# Patient Record
Sex: FEMALE | Race: BLACK OR AFRICAN AMERICAN | NOT HISPANIC OR LATINO | URBAN - METROPOLITAN AREA
[De-identification: names, ages, dates, MRNs, and addresses within clinical notes are randomized per-mention and may not be internally consistent; named-entity substitution may affect disease eponyms.]

---

## 2021-07-07 ENCOUNTER — INPATIENT (INPATIENT)
Facility: HOSPITAL | Age: 26
LOS: 1 days | Discharge: HOME | End: 2021-07-09
Attending: INTERNAL MEDICINE | Admitting: INTERNAL MEDICINE
Payer: MEDICARE

## 2021-07-07 VITALS
SYSTOLIC BLOOD PRESSURE: 128 MMHG | DIASTOLIC BLOOD PRESSURE: 68 MMHG | RESPIRATION RATE: 16 BRPM | WEIGHT: 134.92 LBS | HEIGHT: 63 IN | OXYGEN SATURATION: 97 % | HEART RATE: 95 BPM | TEMPERATURE: 98 F

## 2021-07-07 DIAGNOSIS — Z90.81 ACQUIRED ABSENCE OF SPLEEN: Chronic | ICD-10-CM

## 2021-07-07 DIAGNOSIS — Z90.49 ACQUIRED ABSENCE OF OTHER SPECIFIED PARTS OF DIGESTIVE TRACT: Chronic | ICD-10-CM

## 2021-07-07 LAB
ALBUMIN SERPL ELPH-MCNC: 4.4 G/DL — SIGNIFICANT CHANGE UP (ref 3.5–5.2)
ALP SERPL-CCNC: 72 U/L — SIGNIFICANT CHANGE UP (ref 30–115)
ALT FLD-CCNC: 12 U/L — SIGNIFICANT CHANGE UP (ref 0–41)
ANION GAP SERPL CALC-SCNC: 9 MMOL/L — SIGNIFICANT CHANGE UP (ref 7–14)
AST SERPL-CCNC: 23 U/L — SIGNIFICANT CHANGE UP (ref 0–41)
BASOPHILS # BLD AUTO: 0.11 K/UL — SIGNIFICANT CHANGE UP (ref 0–0.2)
BASOPHILS NFR BLD AUTO: 0.8 % — SIGNIFICANT CHANGE UP (ref 0–1)
BILIRUB SERPL-MCNC: 2.7 MG/DL — HIGH (ref 0.2–1.2)
BUN SERPL-MCNC: 7 MG/DL — LOW (ref 10–20)
CALCIUM SERPL-MCNC: 9.1 MG/DL — SIGNIFICANT CHANGE UP (ref 8.5–10.1)
CHLORIDE SERPL-SCNC: 102 MMOL/L — SIGNIFICANT CHANGE UP (ref 98–110)
CO2 SERPL-SCNC: 26 MMOL/L — SIGNIFICANT CHANGE UP (ref 17–32)
CREAT SERPL-MCNC: 0.5 MG/DL — LOW (ref 0.7–1.5)
EOSINOPHIL # BLD AUTO: 0.84 K/UL — HIGH (ref 0–0.7)
EOSINOPHIL NFR BLD AUTO: 6.1 % — SIGNIFICANT CHANGE UP (ref 0–8)
GLUCOSE SERPL-MCNC: 76 MG/DL — SIGNIFICANT CHANGE UP (ref 70–99)
HCG SERPL QL: NEGATIVE — SIGNIFICANT CHANGE UP
HCT VFR BLD CALC: 22.1 % — LOW (ref 37–47)
HGB BLD-MCNC: 7.8 G/DL — LOW (ref 12–16)
IMM GRANULOCYTES NFR BLD AUTO: 0.3 % — SIGNIFICANT CHANGE UP (ref 0.1–0.3)
LYMPHOCYTES # BLD AUTO: 39.8 % — SIGNIFICANT CHANGE UP (ref 20.5–51.1)
LYMPHOCYTES # BLD AUTO: 5.51 K/UL — HIGH (ref 1.2–3.4)
MAGNESIUM SERPL-MCNC: 1.8 MG/DL — SIGNIFICANT CHANGE UP (ref 1.8–2.4)
MCHC RBC-ENTMCNC: 33.2 PG — HIGH (ref 27–31)
MCHC RBC-ENTMCNC: 35.3 G/DL — SIGNIFICANT CHANGE UP (ref 32–37)
MCV RBC AUTO: 94 FL — SIGNIFICANT CHANGE UP (ref 81–99)
MONOCYTES # BLD AUTO: 1.07 K/UL — HIGH (ref 0.1–0.6)
MONOCYTES NFR BLD AUTO: 7.7 % — SIGNIFICANT CHANGE UP (ref 1.7–9.3)
NEUTROPHILS # BLD AUTO: 6.26 K/UL — SIGNIFICANT CHANGE UP (ref 1.4–6.5)
NEUTROPHILS NFR BLD AUTO: 45.3 % — SIGNIFICANT CHANGE UP (ref 42.2–75.2)
NRBC # BLD: 1 /100 WBCS — HIGH (ref 0–0)
PLATELET # BLD AUTO: 322 K/UL — SIGNIFICANT CHANGE UP (ref 130–400)
POTASSIUM SERPL-MCNC: 3.8 MMOL/L — SIGNIFICANT CHANGE UP (ref 3.5–5)
POTASSIUM SERPL-SCNC: 3.8 MMOL/L — SIGNIFICANT CHANGE UP (ref 3.5–5)
PROT SERPL-MCNC: 7.5 G/DL — SIGNIFICANT CHANGE UP (ref 6–8)
RBC # BLD: 2.35 M/UL — LOW (ref 4.2–5.4)
RBC # FLD: 18.3 % — HIGH (ref 11.5–14.5)
SARS-COV-2 RNA SPEC QL NAA+PROBE: SIGNIFICANT CHANGE UP
SODIUM SERPL-SCNC: 137 MMOL/L — SIGNIFICANT CHANGE UP (ref 135–146)
TROPONIN T SERPL-MCNC: <0.01 NG/ML — SIGNIFICANT CHANGE UP
WBC # BLD: 13.83 K/UL — HIGH (ref 4.8–10.8)
WBC # FLD AUTO: 13.83 K/UL — HIGH (ref 4.8–10.8)

## 2021-07-07 PROCEDURE — 99285 EMERGENCY DEPT VISIT HI MDM: CPT

## 2021-07-07 PROCEDURE — 99223 1ST HOSP IP/OBS HIGH 75: CPT

## 2021-07-07 PROCEDURE — 93010 ELECTROCARDIOGRAM REPORT: CPT

## 2021-07-07 PROCEDURE — 71045 X-RAY EXAM CHEST 1 VIEW: CPT | Mod: 26

## 2021-07-07 RX ORDER — DIPHENHYDRAMINE HCL 50 MG
25 CAPSULE ORAL EVERY 4 HOURS
Refills: 0 | Status: DISCONTINUED | OUTPATIENT
Start: 2021-07-07 | End: 2021-07-09

## 2021-07-07 RX ORDER — ENOXAPARIN SODIUM 100 MG/ML
40 INJECTION SUBCUTANEOUS DAILY
Refills: 0 | Status: DISCONTINUED | OUTPATIENT
Start: 2021-07-07 | End: 2021-07-07

## 2021-07-07 RX ORDER — HYDROXYUREA 500 MG/1
1500 CAPSULE ORAL
Qty: 0 | Refills: 0 | DISCHARGE

## 2021-07-07 RX ORDER — HYDROMORPHONE HYDROCHLORIDE 2 MG/ML
1 INJECTION INTRAMUSCULAR; INTRAVENOUS; SUBCUTANEOUS ONCE
Refills: 0 | Status: DISCONTINUED | OUTPATIENT
Start: 2021-07-07 | End: 2021-07-07

## 2021-07-07 RX ORDER — DIPHENHYDRAMINE HCL 50 MG
50 CAPSULE ORAL ONCE
Refills: 0 | Status: COMPLETED | OUTPATIENT
Start: 2021-07-07 | End: 2021-07-07

## 2021-07-07 RX ORDER — IBUPROFEN 200 MG
600 TABLET ORAL ONCE
Refills: 0 | Status: COMPLETED | OUTPATIENT
Start: 2021-07-07 | End: 2021-07-07

## 2021-07-07 RX ORDER — FOLIC ACID 0.8 MG
1 TABLET ORAL
Qty: 0 | Refills: 0 | DISCHARGE

## 2021-07-07 RX ORDER — KETOROLAC TROMETHAMINE 30 MG/ML
15 SYRINGE (ML) INJECTION ONCE
Refills: 0 | Status: DISCONTINUED | OUTPATIENT
Start: 2021-07-07 | End: 2021-07-07

## 2021-07-07 RX ORDER — METOCLOPRAMIDE HCL 10 MG
10 TABLET ORAL ONCE
Refills: 0 | Status: COMPLETED | OUTPATIENT
Start: 2021-07-07 | End: 2021-07-07

## 2021-07-07 RX ORDER — HYDROMORPHONE HYDROCHLORIDE 2 MG/ML
8 INJECTION INTRAMUSCULAR; INTRAVENOUS; SUBCUTANEOUS ONCE
Refills: 0 | Status: DISCONTINUED | OUTPATIENT
Start: 2021-07-07 | End: 2021-07-07

## 2021-07-07 RX ORDER — ACETAMINOPHEN 500 MG
650 TABLET ORAL EVERY 6 HOURS
Refills: 0 | Status: DISCONTINUED | OUTPATIENT
Start: 2021-07-07 | End: 2021-07-09

## 2021-07-07 RX ORDER — SODIUM CHLORIDE 9 MG/ML
1000 INJECTION INTRAMUSCULAR; INTRAVENOUS; SUBCUTANEOUS ONCE
Refills: 0 | Status: COMPLETED | OUTPATIENT
Start: 2021-07-07 | End: 2021-07-07

## 2021-07-07 RX ORDER — KETOROLAC TROMETHAMINE 30 MG/ML
15 SYRINGE (ML) INJECTION EVERY 6 HOURS
Refills: 0 | Status: DISCONTINUED | OUTPATIENT
Start: 2021-07-07 | End: 2021-07-09

## 2021-07-07 RX ORDER — HYDROXYUREA 500 MG/1
1500 CAPSULE ORAL DAILY
Refills: 0 | Status: DISCONTINUED | OUTPATIENT
Start: 2021-07-07 | End: 2021-07-09

## 2021-07-07 RX ORDER — APIXABAN 2.5 MG/1
5 TABLET, FILM COATED ORAL EVERY 12 HOURS
Refills: 0 | Status: DISCONTINUED | OUTPATIENT
Start: 2021-07-07 | End: 2021-07-09

## 2021-07-07 RX ORDER — SODIUM CHLORIDE 9 MG/ML
1000 INJECTION, SOLUTION INTRAVENOUS
Refills: 0 | Status: DISCONTINUED | OUTPATIENT
Start: 2021-07-07 | End: 2021-07-09

## 2021-07-07 RX ORDER — SODIUM CHLORIDE 9 MG/ML
1000 INJECTION INTRAMUSCULAR; INTRAVENOUS; SUBCUTANEOUS
Refills: 0 | Status: DISCONTINUED | OUTPATIENT
Start: 2021-07-07 | End: 2021-07-07

## 2021-07-07 RX ORDER — HYDROMORPHONE HYDROCHLORIDE 2 MG/ML
2 INJECTION INTRAMUSCULAR; INTRAVENOUS; SUBCUTANEOUS EVERY 4 HOURS
Refills: 0 | Status: DISCONTINUED | OUTPATIENT
Start: 2021-07-07 | End: 2021-07-08

## 2021-07-07 RX ADMIN — Medication 15 MILLIGRAM(S): at 17:19

## 2021-07-07 RX ADMIN — Medication 600 MILLIGRAM(S): at 14:08

## 2021-07-07 RX ADMIN — HYDROMORPHONE HYDROCHLORIDE 1 MILLIGRAM(S): 2 INJECTION INTRAMUSCULAR; INTRAVENOUS; SUBCUTANEOUS at 19:35

## 2021-07-07 RX ADMIN — Medication 50 MILLIGRAM(S): at 14:08

## 2021-07-07 RX ADMIN — SODIUM CHLORIDE 1000 MILLILITER(S): 9 INJECTION INTRAMUSCULAR; INTRAVENOUS; SUBCUTANEOUS at 15:00

## 2021-07-07 RX ADMIN — Medication 25 MILLIGRAM(S): at 20:39

## 2021-07-07 RX ADMIN — HYDROMORPHONE HYDROCHLORIDE 2 MILLIGRAM(S): 2 INJECTION INTRAMUSCULAR; INTRAVENOUS; SUBCUTANEOUS at 20:39

## 2021-07-07 RX ADMIN — HYDROMORPHONE HYDROCHLORIDE 8 MILLIGRAM(S): 2 INJECTION INTRAMUSCULAR; INTRAVENOUS; SUBCUTANEOUS at 14:08

## 2021-07-07 NOTE — ED PROVIDER NOTE - CLINICAL SUMMARY MEDICAL DECISION MAKING FREE TEXT BOX
pt with sickle pain crisis, will check labs, cxr, sickle pain protocol in ED failed to control pain, will admit for pain control

## 2021-07-07 NOTE — H&P ADULT - HISTORY OF PRESENT ILLNESS
25 year old female with pmh of sickle cell disease (on hydroxyurea. follows up at [facility]) presenting with chest pain. Patient reports that she had Sickle cell since childhood with recurrent episodes of sickle cell crisis. her last episode was 2 months ago and she was admitted to [facility]. Today, patient had similar episode with severe chest pain radiating to her arms and rib pain. no SOB, fever, chills, or any other symptoms. she reports that her baseline hb is 8 and she usually gets transfusion when the hb is around 6. she works in [place] so she decided to come here rather than [place]    In the ED, she was HD stable. labs showed leukocytosis 13K, anemia hb 7.8 , bilirubin 2.7. CXR and EKG within normal limits. she was given 1 liter flush and dilaudid/NSAID 25 year old female with pmh of sickle cell disease (on hydroxyurea. follows up at [facility]),  hx of ACS, hx of PE on eliquis, presenting with chest pain. Patient reports that she had Sickle cell since childhood with recurrent episodes of sickle cell crisis. her last episode was 2 months ago and she was admitted to [facility]. Today, patient had similar episode with severe chest pain radiating to her arms and rib pain. no SOB, fever, chills, or any other symptoms. she reports that her baseline hb is 8 and she usually gets transfusion when the hb is around 6. she works in [place] so she decided to come here rather than [place]    In the ED, she was HD stable. labs showed leukocytosis 13K, anemia hb 7.8 , bilirubin 2.7. CXR and EKG within normal limits. she was given 1 liter flush and dilaudid/NSAID

## 2021-07-07 NOTE — ED PROVIDER NOTE - ATTENDING CONTRIBUTION TO CARE
24 yo f with pmh of SSD, acute chest syndrome, PE, presents with c/o pain crisis.  pt admits has chest pain started yesterday, but arms, legs and back pain started today.  pt denies SOB.  no n/v/d, abd pain.  exam: nad, ncat, perrl, eomi, mmm, rrr, ctab, abd soft, nt,nd aox3, ttp all extremities, no leg swelling imp: pt with sickle pain crisis, will check labs, cxr, sickle pain protocol, possible admission

## 2021-07-07 NOTE — ED ADULT TRIAGE NOTE - CHIEF COMPLAINT QUOTE
pt came in with chest pain that started last night, pt has hx of sickle cell, pt stated " I feel like I'm in sickle cell crisis since this morning".

## 2021-07-07 NOTE — H&P ADULT - NSHPPHYSICALEXAM_GEN_ALL_CORE
PHYSICAL EXAM:    GENERAL: NAD, well-developed, AAOx3  HEENT:  Atraumatic, Normocephalic. EOMI, PERRLA, conjunctiva and sclera clear, No JVD  PULMONARY: Clear to auscultation bilaterally; No wheeze  CARDIOVASCULAR: Regular rate and rhythm; No murmurs, rubs, or gallops  GASTROINTESTINAL: Soft, Nontender, Nondistended; Bowel sounds present  MUSCULOSKELETAL:  2+ Peripheral Pulses, No clubbing, cyanosis, or edema  NEUROLOGY: non-focal  SKIN: No rashes or lesions PHYSICAL EXAM:    Vital Signs Last 24 Hrs  T(C): 37 (07 Jul 2021 22:24), Max: 37 (07 Jul 2021 22:24)  T(F): 98.6 (07 Jul 2021 22:24), Max: 98.6 (07 Jul 2021 22:24)  HR: 70 (07 Jul 2021 22:24) (70 - 95)  BP: 112/71 (07 Jul 2021 22:24) (112/71 - 128/68)  BP(mean): --  RR: 18 (07 Jul 2021 22:24) (16 - 18)  SpO2: 100% (07 Jul 2021 16:25) (97% - 100%)    GENERAL: NAD, well-developed, AAOx3  HEENT:  Atraumatic, Normocephalic. EOMI, PERRLA, conjunctiva and sclera clear, No JVD  PULMONARY: Clear to auscultation bilaterally; No wheeze  CARDIOVASCULAR: Regular rate and rhythm; No murmurs, rubs, or gallops  GASTROINTESTINAL: Soft, Nontender, Nondistended; Bowel sounds present  MUSCULOSKELETAL:  2+ Peripheral Pulses, No clubbing, cyanosis, or edema  NEUROLOGY: non-focal  SKIN: No rashes or lesions

## 2021-07-07 NOTE — ED PROVIDER NOTE - OBJECTIVE STATEMENT
Pt is a 25 year old female with PMH Sickle cell disease (type SS, with heme specialist at [facility]) presents to ED with complaints of sickle cell crisis. Pt states pain is  located to chest. Pain is moderate, sharp, non radiating with no alleviating or aggravating factors. Pain is similar to last flare 1 month ago. Pt denies any fever, chills, body aches, sob, abdominal pain, NVCD. no family hx of heart disease

## 2021-07-07 NOTE — H&P ADULT - ASSESSMENT
25 year old female with pmh of sickle cell disease (on hydroxyurea. follows up at [place]) presenting with chest pain    # Sickle cell crisis  - no evidence of Acute chest syndrome ( no SOB, fever, normal CXR)  - Hb stable  - patient follows at [place] (patient considering transfer to [place] since she gets her treatment there)  - for pain control: will start dilaudid IV with IV benadryl PRN (patient reports this works for her)  - NSAID and Tylenol PRN  - consult hematology (patient takes Hydroxyurea at home)  - consult pain control  - DVT prophylaxis  - Hydration /hr  - no need for transfusion now. keep active T&S. CBC qd    # DVT prophylaxis: lovenox  # GI prophylaxis: not indicated  # Activity: as tolerated  # Dispo: acute. admit for pain management 25 year old female with pmh of sickle cell disease (on hydroxyurea. follows up at [place]) presenting with chest pain    # Sickle cell crisis  - no evidence of Acute chest syndrome ( no SOB, fever, normal CXR)  - Hb stable  - patient follows at [place] (patient considering transfer to [place] since she gets her treatment there)  - for pain control: will start dilaudid IV with IV benadryl PRN (patient reports this works for her)  - NSAID and Tylenol PRN  - consult hematology (patient takes Hydroxyurea at home)  - consult pain control  - DVT prophylaxis  - Hydration /hr  - no need for transfusion now. keep active T&S. CBC qd    # DVT prophylaxis: lovenox  # GI prophylaxis: not indicated  # Activity: as tolerated  # Dispo: acute. admit for pain management 25 year old female with pmh of sickle cell disease (on hydroxyurea. follows up at [place]) presenting with chest pain    # Sickle cell crisis  - no evidence of Acute chest syndrome ( no SOB, fever, normal CXR)  - Hb stable  - patient follows at [place] (patient considering transfer to [place] since she gets her treatment there)  - for pain control: will start dilaudid IV with IV benadryl PRN (patient reports this works for her)  - NSAID and Tylenol PRN  - continue home dose of Hydroxyurea  - consult pain control  - continue eliquis 5 bid for PE  - Hydration /hr  - no need for transfusion now. keep active T&S. CBC qd    # DVT prophylaxis: eliquis  # GI prophylaxis: not indicated  # Activity: as tolerated  # Dispo: acute. admit for pain management

## 2021-07-07 NOTE — H&P ADULT - NSHPLABSRESULTS_GEN_ALL_CORE
LABS:                        7.8    13.83 )-----------( 322      ( 07 Jul 2021 15:38 )             22.1     07-07    137  |  102  |  7<L>  ----------------------------<  76  3.8   |  26  |  0.5<L>    Ca    9.1      07 Jul 2021 15:38  Mg     1.8     07-07    TPro  7.5  /  Alb  4.4  /  TBili  2.7<H>  /  DBili  x   /  AST  23  /  ALT  12  /  AlkPhos  72  07-07          Troponin T, Serum: <0.01 ng/mL (07-07-21 @ 15:38)      CARDIAC MARKERS ( 07 Jul 2021 15:38 )  x     / <0.01 ng/mL / x     / x     / x          RADIOLOGY:      PHYSICAL EXAM:    GENERAL: NAD, well-developed, AAOx3  HEENT:  Atraumatic, Normocephalic. EOMI, PERRLA, conjunctiva and sclera clear, No JVD  PULMONARY: Clear to auscultation bilaterally; No wheeze  CARDIOVASCULAR: Regular rate and rhythm; No murmurs, rubs, or gallops  GASTROINTESTINAL: Soft, Nontender, Nondistended; Bowel sounds present  MUSCULOSKELETAL:  2+ Peripheral Pulses, No clubbing, cyanosis, or edema  NEUROLOGY: non-focal  SKIN: No rashes or lesions

## 2021-07-08 LAB
ALBUMIN SERPL ELPH-MCNC: 4.2 G/DL — SIGNIFICANT CHANGE UP (ref 3.5–5.2)
ALP SERPL-CCNC: 68 U/L — SIGNIFICANT CHANGE UP (ref 30–115)
ALT FLD-CCNC: 11 U/L — SIGNIFICANT CHANGE UP (ref 0–41)
ANION GAP SERPL CALC-SCNC: 7 MMOL/L — SIGNIFICANT CHANGE UP (ref 7–14)
APPEARANCE UR: CLEAR — SIGNIFICANT CHANGE UP
AST SERPL-CCNC: 26 U/L — SIGNIFICANT CHANGE UP (ref 0–41)
BILIRUB SERPL-MCNC: 3.8 MG/DL — HIGH (ref 0.2–1.2)
BILIRUB UR-MCNC: NEGATIVE — SIGNIFICANT CHANGE UP
BLD GP AB SCN SERPL QL: SIGNIFICANT CHANGE UP
BUN SERPL-MCNC: 9 MG/DL — LOW (ref 10–20)
CALCIUM SERPL-MCNC: 8.8 MG/DL — SIGNIFICANT CHANGE UP (ref 8.5–10.1)
CHLORIDE SERPL-SCNC: 106 MMOL/L — SIGNIFICANT CHANGE UP (ref 98–110)
CO2 SERPL-SCNC: 25 MMOL/L — SIGNIFICANT CHANGE UP (ref 17–32)
COLOR SPEC: YELLOW — SIGNIFICANT CHANGE UP
CREAT SERPL-MCNC: 0.5 MG/DL — LOW (ref 0.7–1.5)
DIFF PNL FLD: NEGATIVE — SIGNIFICANT CHANGE UP
GLUCOSE SERPL-MCNC: 70 MG/DL — SIGNIFICANT CHANGE UP (ref 70–99)
GLUCOSE UR QL: NEGATIVE — SIGNIFICANT CHANGE UP
HCT VFR BLD CALC: 18.4 % — LOW (ref 37–47)
HGB BLD-MCNC: 6.4 G/DL — CRITICAL LOW (ref 12–16)
IRON SATN MFR SERPL: 184 UG/DL — HIGH (ref 35–150)
IRON SATN MFR SERPL: 89 % — HIGH (ref 15–50)
KETONES UR-MCNC: NEGATIVE — SIGNIFICANT CHANGE UP
LDH SERPL L TO P-CCNC: 341 — HIGH (ref 50–242)
LEUKOCYTE ESTERASE UR-ACNC: NEGATIVE — SIGNIFICANT CHANGE UP
MCHC RBC-ENTMCNC: 32.8 PG — HIGH (ref 27–31)
MCHC RBC-ENTMCNC: 34.8 G/DL — SIGNIFICANT CHANGE UP (ref 32–37)
MCV RBC AUTO: 94.4 FL — SIGNIFICANT CHANGE UP (ref 81–99)
NITRITE UR-MCNC: NEGATIVE — SIGNIFICANT CHANGE UP
NRBC # BLD: 2 /100 WBCS — HIGH (ref 0–0)
PH UR: 6.5 — SIGNIFICANT CHANGE UP (ref 5–8)
PLATELET # BLD AUTO: 250 K/UL — SIGNIFICANT CHANGE UP (ref 130–400)
POTASSIUM SERPL-MCNC: 3.8 MMOL/L — SIGNIFICANT CHANGE UP (ref 3.5–5)
POTASSIUM SERPL-SCNC: 3.8 MMOL/L — SIGNIFICANT CHANGE UP (ref 3.5–5)
PROT SERPL-MCNC: 7 G/DL — SIGNIFICANT CHANGE UP (ref 6–8)
PROT UR-MCNC: NEGATIVE — SIGNIFICANT CHANGE UP
RBC # BLD: 1.95 M/UL — LOW (ref 4.2–5.4)
RBC # BLD: 1.95 M/UL — LOW (ref 4.2–5.4)
RBC # FLD: 18 % — HIGH (ref 11.5–14.5)
RETICS #: 247.1 K/UL — HIGH (ref 25–125)
RETICS/RBC NFR: 12.7 % — HIGH (ref 0.5–1.5)
SODIUM SERPL-SCNC: 138 MMOL/L — SIGNIFICANT CHANGE UP (ref 135–146)
SP GR SPEC: 1.01 — SIGNIFICANT CHANGE UP (ref 1.01–1.03)
TIBC SERPL-MCNC: 206 UG/DL — LOW (ref 220–430)
UIBC SERPL-MCNC: 22 UG/DL — LOW (ref 110–370)
UROBILINOGEN FLD QL: ABNORMAL
WBC # BLD: 19.51 K/UL — HIGH (ref 4.8–10.8)
WBC # FLD AUTO: 19.51 K/UL — HIGH (ref 4.8–10.8)

## 2021-07-08 PROCEDURE — 99233 SBSQ HOSP IP/OBS HIGH 50: CPT

## 2021-07-08 PROCEDURE — 71045 X-RAY EXAM CHEST 1 VIEW: CPT | Mod: 26

## 2021-07-08 RX ORDER — FOLIC ACID 0.8 MG
1 TABLET ORAL DAILY
Refills: 0 | Status: DISCONTINUED | OUTPATIENT
Start: 2021-07-08 | End: 2021-07-09

## 2021-07-08 RX ORDER — HYDROMORPHONE HYDROCHLORIDE 2 MG/ML
2 INJECTION INTRAMUSCULAR; INTRAVENOUS; SUBCUTANEOUS EVERY 4 HOURS
Refills: 0 | Status: DISCONTINUED | OUTPATIENT
Start: 2021-07-08 | End: 2021-07-09

## 2021-07-08 RX ORDER — SENNA PLUS 8.6 MG/1
2 TABLET ORAL AT BEDTIME
Refills: 0 | Status: DISCONTINUED | OUTPATIENT
Start: 2021-07-08 | End: 2021-07-09

## 2021-07-08 RX ADMIN — Medication 25 MILLIGRAM(S): at 01:00

## 2021-07-08 RX ADMIN — Medication 650 MILLIGRAM(S): at 11:33

## 2021-07-08 RX ADMIN — Medication 650 MILLIGRAM(S): at 23:17

## 2021-07-08 RX ADMIN — HYDROXYUREA 1500 MILLIGRAM(S): 500 CAPSULE ORAL at 22:35

## 2021-07-08 RX ADMIN — Medication 650 MILLIGRAM(S): at 00:01

## 2021-07-08 RX ADMIN — HYDROMORPHONE HYDROCHLORIDE 2 MILLIGRAM(S): 2 INJECTION INTRAMUSCULAR; INTRAVENOUS; SUBCUTANEOUS at 17:03

## 2021-07-08 RX ADMIN — HYDROMORPHONE HYDROCHLORIDE 2 MILLIGRAM(S): 2 INJECTION INTRAMUSCULAR; INTRAVENOUS; SUBCUTANEOUS at 01:00

## 2021-07-08 RX ADMIN — Medication 650 MILLIGRAM(S): at 17:04

## 2021-07-08 RX ADMIN — Medication 25 MILLIGRAM(S): at 10:14

## 2021-07-08 RX ADMIN — Medication 25 MILLIGRAM(S): at 17:03

## 2021-07-08 RX ADMIN — HYDROMORPHONE HYDROCHLORIDE 2 MILLIGRAM(S): 2 INJECTION INTRAMUSCULAR; INTRAVENOUS; SUBCUTANEOUS at 10:14

## 2021-07-08 RX ADMIN — Medication 1 MILLIGRAM(S): at 11:33

## 2021-07-08 RX ADMIN — HYDROMORPHONE HYDROCHLORIDE 2 MILLIGRAM(S): 2 INJECTION INTRAMUSCULAR; INTRAVENOUS; SUBCUTANEOUS at 05:25

## 2021-07-08 RX ADMIN — APIXABAN 5 MILLIGRAM(S): 2.5 TABLET, FILM COATED ORAL at 10:13

## 2021-07-08 RX ADMIN — APIXABAN 5 MILLIGRAM(S): 2.5 TABLET, FILM COATED ORAL at 22:34

## 2021-07-08 RX ADMIN — SENNA PLUS 2 TABLET(S): 8.6 TABLET ORAL at 22:34

## 2021-07-08 RX ADMIN — Medication 25 MILLIGRAM(S): at 22:35

## 2021-07-08 RX ADMIN — HYDROMORPHONE HYDROCHLORIDE 2 MILLIGRAM(S): 2 INJECTION INTRAMUSCULAR; INTRAVENOUS; SUBCUTANEOUS at 23:13

## 2021-07-08 RX ADMIN — HYDROMORPHONE HYDROCHLORIDE 2 MILLIGRAM(S): 2 INJECTION INTRAMUSCULAR; INTRAVENOUS; SUBCUTANEOUS at 22:34

## 2021-07-08 RX ADMIN — Medication 25 MILLIGRAM(S): at 05:24

## 2021-07-08 NOTE — PROGRESS NOTE ADULT - ASSESSMENT
26 yo F pt with sickle cell disease (reports history of monthly flare-ups and was admitted at [facility] in [place] ~ 1 mth ago) presented with pain in her chest and arm for one day.     # Acute sickle cell pain crisis. As of now no acute chest pain syndrome.  - CXR with no infiltrates  - pt is on room air  - cont IV hydration  - cont Dilaudid with Benadryl  - no need for transfusion for now  - monitor retic counts, LDH, CBC  - heme/onc eval   - cont Hydrea with Folate supplement     # Hx of pulmonary embolism on apixaban  - cont 5 mg BID    DVT ppx        24 yo F pt with sickle cell disease (reports history of monthly flare-ups and was admitted at [facility] in [place] ~ 1 mth ago) presented with pain in her chest and arm for one day.     # Acute sickle cell pain crisis. As of now no acute chest pain syndrome.  - CXR with no infiltrates  - pt is on room air  - cont IV hydration  - cont Dilaudid with Benadryl  - no need for transfusion for now  - monitor retic counts, LDH, CBC  - heme/onc eval   - cont Hydrea with Folate supplement     # leucocytosis - no clinical signs of infection, no fever, ROS negative, check UA with UCx, no ABx for now, monitor WBC.    # Hx of pulmonary embolism on apixaban  - cont 5 mg BID    DVT ppx

## 2021-07-08 NOTE — CHART NOTE - NSCHARTNOTEFT_GEN_A_CORE
Pt seen and interviewed at bedside, chart reviewed. 25 year old female with known history of sickle cell anemia, now admitted in sickle crisis. Pt sitting up in bed in no apparent distress, eating pizza. States her pain score at present is 6/10. I offered her a trial of PCA therapy but she refused. She stated she didn't want "all that medicine". I explained to her that the PCA would be under her control and she would likely use less medicine overall than if she had to ask for PRN pain meds. She stated she had PCA therapy before and she was adamant in her refusal. She stated she would prefer "around the clock medicine". Pt refusing intervention from the acute pain service at this time. Would suggest standing orders for IV morphine or IV dilaudid every 4-6 hours PRN as tolerated, as well as adequate IV hydration, ABGs to evaluate oxygenation, and active patient warming.

## 2021-07-08 NOTE — PROGRESS NOTE ADULT - ASSESSMENT
25 year old female with pmh of sickle cell disease (on hydroxyurea. follows up at [facility]) presenting with chest pain    # Sickle cell crisis  - no evidence of Acute chest syndrome ( no SOB, fever, normal CXR)  - Hemoglobin: 6.4 g/dL (07.08.21 @ 10:59)  - patient follows at [facility] (patient considering transfer to [facility] since she gets her treatment there)  - for pain control: acetaminophen tab, Ketorolac IV, hydromorphone IV with IV benadryl PRN (patient reports this works for her)  - continue home dose of Hydroxyurea 1.5g daily  - consult pain control  - eliquis 5mg bid for PE  - Hydration D5W+0.45%NS- 200ml/hr  - keep active T&S. CBC qd  - Iron profile workup pending    - Senna 2 tab every day    # DVT prophylaxis: eliquis  # GI prophylaxis: not indicated  # Activity: as tolerated  # Dispo: acute. admit for pain management

## 2021-07-09 ENCOUNTER — TRANSCRIPTION ENCOUNTER (OUTPATIENT)
Age: 26
End: 2021-07-09

## 2021-07-09 VITALS
HEART RATE: 100 BPM | RESPIRATION RATE: 18 BRPM | SYSTOLIC BLOOD PRESSURE: 129 MMHG | DIASTOLIC BLOOD PRESSURE: 67 MMHG | TEMPERATURE: 98 F

## 2021-07-09 LAB
ALBUMIN SERPL ELPH-MCNC: 4 G/DL — SIGNIFICANT CHANGE UP (ref 3.5–5.2)
ALP SERPL-CCNC: 67 U/L — SIGNIFICANT CHANGE UP (ref 30–115)
ALT FLD-CCNC: 13 U/L — SIGNIFICANT CHANGE UP (ref 0–41)
ANION GAP SERPL CALC-SCNC: 9 MMOL/L — SIGNIFICANT CHANGE UP (ref 7–14)
AST SERPL-CCNC: 28 U/L — SIGNIFICANT CHANGE UP (ref 0–41)
BASOPHILS # BLD AUTO: 0.14 K/UL — SIGNIFICANT CHANGE UP (ref 0–0.2)
BASOPHILS NFR BLD AUTO: 0.9 % — SIGNIFICANT CHANGE UP (ref 0–1)
BILIRUB SERPL-MCNC: 4.1 MG/DL — HIGH (ref 0.2–1.2)
BUN SERPL-MCNC: 6 MG/DL — LOW (ref 10–20)
CALCIUM SERPL-MCNC: 8.7 MG/DL — SIGNIFICANT CHANGE UP (ref 8.5–10.1)
CHLORIDE SERPL-SCNC: 105 MMOL/L — SIGNIFICANT CHANGE UP (ref 98–110)
CO2 SERPL-SCNC: 25 MMOL/L — SIGNIFICANT CHANGE UP (ref 17–32)
CREAT SERPL-MCNC: 0.5 MG/DL — LOW (ref 0.7–1.5)
EOSINOPHIL # BLD AUTO: 2.85 K/UL — HIGH (ref 0–0.7)
EOSINOPHIL NFR BLD AUTO: 18.3 % — HIGH (ref 0–8)
FERRITIN SERPL-MCNC: 1401 NG/ML — HIGH (ref 15–150)
GLUCOSE SERPL-MCNC: 83 MG/DL — SIGNIFICANT CHANGE UP (ref 70–99)
HCT VFR BLD CALC: 19 % — LOW (ref 37–47)
HGB BLD-MCNC: 6.5 G/DL — CRITICAL LOW (ref 12–16)
LDH SERPL L TO P-CCNC: 363 — HIGH (ref 50–242)
LYMPHOCYTES # BLD AUTO: 33.9 % — SIGNIFICANT CHANGE UP (ref 20.5–51.1)
LYMPHOCYTES # BLD AUTO: 5.28 K/UL — HIGH (ref 1.2–3.4)
MCHC RBC-ENTMCNC: 31.9 PG — HIGH (ref 27–31)
MCHC RBC-ENTMCNC: 34.2 G/DL — SIGNIFICANT CHANGE UP (ref 32–37)
MCV RBC AUTO: 93.1 FL — SIGNIFICANT CHANGE UP (ref 81–99)
MONOCYTES # BLD AUTO: 1.76 K/UL — HIGH (ref 0.1–0.6)
MONOCYTES NFR BLD AUTO: 11.3 % — HIGH (ref 1.7–9.3)
NEUTROPHILS # BLD AUTO: 4.73 K/UL — SIGNIFICANT CHANGE UP (ref 1.4–6.5)
NEUTROPHILS NFR BLD AUTO: 30.4 % — LOW (ref 42.2–75.2)
NRBC # BLD: SIGNIFICANT CHANGE UP /100 WBCS (ref 0–0)
PLATELET # BLD AUTO: 309 K/UL — SIGNIFICANT CHANGE UP (ref 130–400)
POTASSIUM SERPL-MCNC: 3.5 MMOL/L — SIGNIFICANT CHANGE UP (ref 3.5–5)
POTASSIUM SERPL-SCNC: 3.5 MMOL/L — SIGNIFICANT CHANGE UP (ref 3.5–5)
PROT SERPL-MCNC: 6.9 G/DL — SIGNIFICANT CHANGE UP (ref 6–8)
RBC # BLD: 2.04 M/UL — LOW (ref 4.2–5.4)
RBC # BLD: 2.04 M/UL — LOW (ref 4.2–5.4)
RBC # FLD: 18.4 % — HIGH (ref 11.5–14.5)
RETICS #: 260.9 K/UL — HIGH (ref 25–125)
RETICS/RBC NFR: 12.8 % — HIGH (ref 0.5–1.5)
SODIUM SERPL-SCNC: 139 MMOL/L — SIGNIFICANT CHANGE UP (ref 135–146)
WBC # BLD: 15.57 K/UL — HIGH (ref 4.8–10.8)
WBC # FLD AUTO: 15.57 K/UL — HIGH (ref 4.8–10.8)

## 2021-07-09 PROCEDURE — 99232 SBSQ HOSP IP/OBS MODERATE 35: CPT

## 2021-07-09 RX ORDER — HYDROMORPHONE HYDROCHLORIDE 2 MG/ML
2 INJECTION INTRAMUSCULAR; INTRAVENOUS; SUBCUTANEOUS ONCE
Refills: 0 | Status: DISCONTINUED | OUTPATIENT
Start: 2021-07-09 | End: 2021-07-09

## 2021-07-09 RX ORDER — APIXABAN 2.5 MG/1
1 TABLET, FILM COATED ORAL
Qty: 0 | Refills: 0 | DISCHARGE

## 2021-07-09 RX ADMIN — HYDROMORPHONE HYDROCHLORIDE 2 MILLIGRAM(S): 2 INJECTION INTRAMUSCULAR; INTRAVENOUS; SUBCUTANEOUS at 01:23

## 2021-07-09 RX ADMIN — Medication 650 MILLIGRAM(S): at 05:26

## 2021-07-09 RX ADMIN — HYDROMORPHONE HYDROCHLORIDE 2 MILLIGRAM(S): 2 INJECTION INTRAMUSCULAR; INTRAVENOUS; SUBCUTANEOUS at 11:24

## 2021-07-09 RX ADMIN — APIXABAN 5 MILLIGRAM(S): 2.5 TABLET, FILM COATED ORAL at 05:26

## 2021-07-09 RX ADMIN — HYDROMORPHONE HYDROCHLORIDE 2 MILLIGRAM(S): 2 INJECTION INTRAMUSCULAR; INTRAVENOUS; SUBCUTANEOUS at 14:18

## 2021-07-09 RX ADMIN — Medication 1 MILLIGRAM(S): at 11:24

## 2021-07-09 RX ADMIN — Medication 25 MILLIGRAM(S): at 01:30

## 2021-07-09 RX ADMIN — Medication 25 MILLIGRAM(S): at 08:30

## 2021-07-09 RX ADMIN — Medication 25 MILLIGRAM(S): at 12:52

## 2021-07-09 RX ADMIN — HYDROMORPHONE HYDROCHLORIDE 2 MILLIGRAM(S): 2 INJECTION INTRAMUSCULAR; INTRAVENOUS; SUBCUTANEOUS at 08:29

## 2021-07-09 NOTE — DISCHARGE NOTE PROVIDER - HOSPITAL COURSE
Pt. is a 25 year old female with sickle cell disease- on hydroxyurea(doubt compliance based on CBC volumes), follows up at [place]. There is history of recurrent episode of sickle cell crisis (last episode 2 months ago), PE on eliquis and ACS.    - In ED, she reported severe chest pain radiating to her arms, labs showed leukocytosis 13K, anemia hb 7.8 , bilirubin 2.7. CXR and EKG within normal limits. she was given 1 liter flush and dilaudid/NSAID.  - She reported her normal Hb baseline is 8 and usually gets transfusions if Hb is around 6.    = She was diagnosed with Sickle cell crisis, with no evidence of Acute chest syndrome ( no SOB, fever, normal CXR).  - Her Hemoglobin dropped till 6.4 g/dL (07.08.21 @ 10:59) and remained at Hemoglobin: 6.5 g/dL (07.09.21 @ 06:55). No transfusion was done.  - for pain control following was given: acetaminophen tab, Ketorolac IV, hydromorphone IV with IV benadryl PRN (patient reports this works for her), Folic acid and fluid.  - Her home dose of Hydroxyurea 1.5g daily was continued.  - Iron profile was abnormal:    Iron with Total Binding Capacity (07.08.21 @ 10:59)    Iron - Total Binding Capacity.: 206 ug/dL    % Saturation, Iron: 89 %    Iron Total, Serum: 184 ug/dL    Unsaturated Iron Binding Capacity: 22 ug/dL    - Apixaban 5 mg BD was continued to prevent PE.  - She refused PCA therapy for pain control. She stated she would prefer "around the clock medicine".  - There was one episode of abnormal behaviour on 7/8 night. Pt. undressed herself and roam around and also tried to pull out her port. Secondary to hydromorphone and benadryl?    - Pt. is stable right now.   Pt. is a 25 year old female with sickle cell disease- on hydroxyurea(doubt compliance based on CBC volumes), follows up at [place]. There is history of recurrent episode of sickle cell crisis (last episode 2 months ago), PE on eliquis and ACS.    - In ED, she reported severe chest pain radiating to her arms, labs showed leukocytosis 13K, anemia hb 7.8 , bilirubin 2.7. CXR and EKG within normal limits. she was given 1 liter flush and dilaudid/NSAID.  - She reported her normal Hb baseline is 8 and usually gets transfusions if Hb is around 6.    = She was diagnosed with Sickle cell crisis, with no evidence of Acute chest syndrome ( no SOB, fever, normal CXR).  - Her Hemoglobin dropped till 6.4 g/dL (07.08.21 @ 10:59) and remained at Hemoglobin: 6.5 g/dL (07.09.21 @ 06:55). No transfusion was done as Pt. with iron overload, and vitals stable.  - for pain control following was given: acetaminophen tab, Ketorolac IV, hydromorphone IV with IV benadryl PRN (patient reports this works for her), Folic acid and fluid.  - Her home dose of Hydroxyurea 1.5g daily was continued.  - Iron profile was abnormal:    Iron with Total Binding Capacity (07.08.21 @ 10:59)    Iron - Total Binding Capacity.: 206 ug/dL    % Saturation, Iron: 89 %    Iron Total, Serum: 184 ug/dL    Unsaturated Iron Binding Capacity: 22 ug/dL    - Apixaban 5 mg BD was continued to prevent PE.  - She refused PCA therapy for pain control. She stated she would prefer "around the clock medicine".  - There was one episode of abnormal behaviour on 7/8 night. Pt. undressed herself and roam around and also tried to pull out her port. Secondary to hydromorphone and benadryl?    - Pt. is stable right now.   Pt. is a 25 year old female with sickle cell disease- on hydroxyurea(doubt compliance based on CBC volumes), follows up at [place]. There is history of recurrent episode of sickle cell crisis (last episode 2 months ago), PE on eliquis and ACS.    - In ED, she reported severe chest pain radiating to her arms, labs showed leukocytosis 13K, anemia hb 7.8 , bilirubin 2.7. CXR and EKG within normal limits. she was given 1 liter flush and dilaudid/NSAID.  - She reported her normal Hb baseline is 8 and usually gets transfusions if Hb is around 6.    = She was diagnosed with Sickle cell crisis, with no evidence of Acute chest syndrome ( no SOB, fever, normal CXR).  - Her Hemoglobin dropped till 6.4 g/dL (07.08.21 @ 10:59) and remained at Hemoglobin: 6.5 g/dL (07.09.21 @ 06:55). No transfusion was done as Pt. with iron overload, and vitals stable.  - for pain control following was given: acetaminophen tab, Ketorolac IV, hydromorphone IV with IV benadryl PRN (patient reports this works for her), Folic acid and fluid.  - Her home dose of Hydroxyurea 1.5g daily was continued.  - Iron profile was abnormal:    Iron with Total Binding Capacity (07.08.21 @ 10:59)    Iron - Total Binding Capacity.: 206 ug/dL    % Saturation, Iron: 89 %    Iron Total, Serum: 184 ug/dL    Unsaturated Iron Binding Capacity: 22 ug/dL    - Apixaban 5 mg BD was continued to prevent PE.    Pt states her pain is better and she is insisting to be discharged today since she has some trips planned. Since labs stable pt will be discharged.

## 2021-07-09 NOTE — CHART NOTE - NSCHARTNOTEFT_GEN_A_CORE
<<<RESIDENT DISCHARGE NOTE>>>     DACIA CASTAÑEDA  MRN-[id number]    VITAL SIGNS:  T(F): 97.6 (07-09-21 @ 08:03), Max: 97.6 (07-09-21 @ 08:03)  HR: 89 (07-09-21 @ 08:03)  BP: 94/51 (07-09-21 @ 08:03)  SpO2: 97% (07-09-21 @ 08:03)      PHYSICAL EXAMINATION:  LOS: 2d    VITALS:   T(C): 36.4 (07-09-21 @ 08:03), Max: 36.4 (07-08-21 @ 23:00)  HR: 89 (07-09-21 @ 08:03) (87 - 89)  BP: 94/51 (07-09-21 @ 08:03) (94/51 - 133/60)  RR: 18 (07-09-21 @ 08:03) (18 - 18)  SpO2: 97% (07-09-21 @ 08:03) (97% - 97%)    GENERAL: NAD, lying in bed comfortably  HEAD:  Atraumatic, Normocephalic  CHEST/LUNG: Clear to auscultation bilaterally; No rales, rhonchi, wheezing, or rubs. Unlabored respirations  HEART: Regular rate and rhythm; No murmurs, rubs, or gallops  ABDOMEN: BSx4; Soft, nontender, nondistended  EXTREMITIES:  2+ Peripheral Pulses, brisk capillary refill. No clubbing, cyanosis, or edema  NERVOUS SYSTEM:  A&Ox3, no focal deficits   SKIN: Some dark spots were seen- generalized- were present on admission as well    TEST RESULTS:                        6.5    15.57 )-----------( 309      ( 09 Jul 2021 06:55 )             19.0       07-09    139  |  105  |  6<L>  ----------------------------<  83  3.5   |  25  |  0.5<L>    Ca    8.7      09 Jul 2021 06:55  Mg     1.8     07-07    TPro  6.9  /  Alb  4.0  /  TBili  4.1<H>  /  DBili  x   /  AST  28  /  ALT  13  /  AlkPhos  67  07-09

## 2021-07-09 NOTE — DISCHARGE NOTE PROVIDER - NSDCMRMEDTOKEN_GEN_ALL_CORE_FT
folic acid 1 mg oral tablet: 1 tab(s) orally once a day  hydroxyurea: 1500 milligram(s) orally once a day   Eliquis 5 mg oral tablet: 1 tab(s) orally 2 times a day  folic acid 1 mg oral tablet: 1 tab(s) orally once a day  hydroxyurea: 1500 milligram(s) orally once a day

## 2021-07-09 NOTE — DISCHARGE NOTE PROVIDER - CARE PROVIDER_API CALL
Tatiana Hidalgo [facility]  Hematology/Oncology  [phone] [place] [address]  Phone: [phone]  Fax: [phone]  Follow Up Time:

## 2021-07-09 NOTE — DISCHARGE NOTE PROVIDER - NSDCCPCAREPLAN_GEN_ALL_CORE_FT
PRINCIPAL DISCHARGE DIAGNOSIS  Diagnosis: Anemia, sickle cell with crisis  Assessment and Plan of Treatment: You had an episode of sickle cell crisis. Your red blood cells are abnormal in shape because you have sickle cell disease and when your body is in some any kind of stress then those cells can change shape and block the arteries leading to pain. Please,take your hydroxyurea as prescribed and stay hydrated to prevent this in future.       PRINCIPAL DISCHARGE DIAGNOSIS  Diagnosis: Anemia, sickle cell with crisis  Assessment and Plan of Treatment: You had an episode of sickle cell crisis. Your red blood cells are abnormal in shape because you have sickle cell disease and when your body is in some any kind of stress then those cells can change shape and block the arteries leading to pain. Please,take your hydroxyurea as prescribed and stay hydrated to prevent this in future. Please follow up with your hematologist.

## 2021-07-09 NOTE — DISCHARGE NOTE NURSING/CASE MANAGEMENT/SOCIAL WORK - PATIENT PORTAL LINK FT
You can access the FollowMyHealth Patient Portal offered by [facility] by registering at the following website: [email or web address]. By joining [facility]’s FollowMyHealth portal, you will also be able to view your health information using other applications (apps) compatible with our system.

## 2021-07-09 NOTE — CHART NOTE - NSCHARTNOTEFT_GEN_A_CORE
Pt was reported to be displaying abnormal behavior Overnight as per nursing staff. On examination patient was AAOX3 without any obvious neurological deficit on examination. Pt receives Dilaudid and benadryl IV, I held the dilaudid as patient does not report to be in pain. Will continue to monitor. Pt was reported to be displaying abnormal behavior Overnight as per nursing staff. On examination patient was AAOX3 without any obvious neurological deficit on examination. Pt receives Dilaudid and benadryl IV, I held the dilaudid as patient does not report to be in pain.     Plan  -Urine drug screen  -Pshyc consult prior to discharge  -Will continue to monitor. Dermal Autograft Text: The defect edges were debeveled with a #15 scalpel blade.  Given the location of the defect, shape of the defect and the proximity to free margins a dermal autograft was deemed most appropriate.  Using a sterile surgical marker, the primary defect shape was transferred to the donor site. The area thus outlined was incised deep to adipose tissue with a #15 scalpel blade.  The harvested graft was then trimmed of adipose and epidermal tissue until only dermis was left.  The skin graft was then placed in the primary defect and oriented appropriately.

## 2021-07-09 NOTE — PROGRESS NOTE ADULT - SUBJECTIVE AND OBJECTIVE BOX
DACIA CASTAÑEDA    Patient is a 25y old  Female who presents with a chief complaint of chest pain (2021 10:24)    INTERVAL HPI/OVERNIGHT EVENTS: Pt wanted leave AMA last night because she has upcoming trip, but decided to stay until today. Today her pain is better, chest pain and arms pain resolved, she has some pain in legs. No other complaints. Overall she feels better and insist to go home.      ROS: All ROS negative except legs pain     PHYSICAL EXAM:  T(C): 36.4, Max: 36.4 (21 @ 23:00)  HR: 89 (72 - 89)  BP: 94/51 (94/51 - 133/60)  RR: 18 (18 - 18)  SpO2: 97% (97% - 97%)    GENERAL: NAD  PULMONARY/CHEST: No rales, rhonchi, wheezing  CARDIOVASC: Regular rate and rhythm; No murmurs  GI/ABDOMEN: Soft, Nontender, Nondistended; Bowel sounds present  EXTREMITIES:  2+ Peripheral Pulses, No clubbing, cyanosis, or edema, no deformity. No calf tenderness b/l.  SKIN: +b/l LE and UE with flat top hyperpigmented rash  NERVOUS SYSTEM:  Alert & Oriented X3, no focal deficit     LABS:                        6.5    15.57 )-----------( 309      ( 2021 06:55 )             19.0     Reticulocyte Count (21 @ 06:55)   RBC Count: 2.04 M/uL   Reticulocyte Percent: 12.8 %   Absolute Reticulocytes: 260.9 K/uL   Lactate Dehydrogenase, Serum: 363 (21 @ 06:55)         139  |  105  |  6<L>  ----------------------------<  83  3.5   |  25  |  0.5<L>    Ca    8.7      2021 06:55  Mg     1.8     -    TPro  6.9  /  Alb  4.0  /  TBili  4.1<H>  /  DBili  x   /  AST  28  /  ALT  13  /  AlkPhos  67  -      Urinalysis Basic - ( 2021 12:40 )    Color: Yellow / Appearance: Clear / S.009 / pH: x  Gluc: x / Ketone: Negative  / Bili: Negative / Urobili: 3 mg/dL   Blood: x / Protein: Negative / Nitrite: Negative   Leuk Esterase: Negative / RBC: x / WBC x   Sq Epi: x / Non Sq Epi: x / Bacteria: x      RADIOLOGY & ADDITIONAL TESTS:  no new tests      MEDICATIONS  (STANDING):  acetaminophen   Tablet .. 650 milliGRAM(s) Oral every 6 hours  apixaban 5 milliGRAM(s) Oral every 12 hours  dextrose 5% + sodium chloride 0.45%. 1000 milliLiter(s) (200 mL/Hr) IV Continuous <Continuous>  folic acid 1 milliGRAM(s) Oral daily  HYDROmorphone  Injectable 2 milliGRAM(s) IV Push every 4 hours  hydroxyurea 1500 milliGRAM(s) Oral daily  senna 2 Tablet(s) Oral at bedtime    MEDICATIONS  (PRN):  diphenhydrAMINE   Injectable 25 milliGRAM(s) IV Push every 4 hours PRN Itching  ketorolac   Injectable 15 milliGRAM(s) IV Push every 6 hours PRN Moderate Pain (4 - 6)      
SUBJECTIVE:    Patient is a 25y old Female who presents with a chief complaint of chest pain (2021 19:10)    Currently admitted to medicine with the primary diagnosis of Chest pain       Today is hospital day 1d.  reports no new issues or overnight events.   Pt. is uncomfortable, endorses pain in all extremities and chest. Pt. was unable to sleep properly last night because she felt uncomfortable while lying in bed.    PAST MEDICAL & SURGICAL HISTORY  Sickle cell anemia    S/P cholecystectomy    H/O splenectomy      SOCIAL HISTORY:  Negative for smoking/alcohol/drug use.     ALLERGIES:  No Known Allergies    MEDICATIONS:  STANDING MEDICATIONS  acetaminophen   Tablet .. 650 milliGRAM(s) Oral every 6 hours  apixaban 5 milliGRAM(s) Oral every 12 hours  dextrose 5% + sodium chloride 0.45%. 1000 milliLiter(s) IV Continuous <Continuous>  folic acid 1 milliGRAM(s) Oral daily  hydroxyurea 1500 milliGRAM(s) Oral daily  senna 2 Tablet(s) Oral at bedtime    PRN MEDICATIONS  diphenhydrAMINE   Injectable 25 milliGRAM(s) IV Push every 4 hours PRN  HYDROmorphone  Injectable 2 milliGRAM(s) IV Push every 4 hours PRN  ketorolac   Injectable 15 milliGRAM(s) IV Push every 6 hours PRN    VITALS:   T(F): 96.3  HR: 97  BP: 124/74  RR: 18  SpO2: 94%    LABS:                        6.4    19.51 )-----------( 250      ( 2021 10:59 )             18.4     07-    138  |  106  |  9<L>  ----------------------------<  70  3.8   |  25  |  0.5<L>    Ca    8.8      2021 10:59  Mg     1.8     -    TPro  7.0  /  Alb  4.2  /  TBili  3.8<H>  /  DBili  x   /  AST  26  /  ALT  11  /  AlkPhos  68        Urinalysis Basic - ( 2021 12:40 )    Color: Yellow / Appearance: Clear / S.009 / pH: x  Gluc: x / Ketone: Negative  / Bili: Negative / Urobili: 3 mg/dL   Blood: x / Protein: Negative / Nitrite: Negative   Leuk Esterase: Negative / RBC: x / WBC x   Sq Epi: x / Non Sq Epi: x / Bacteria: x        Troponin T, Serum: <0.01 ng/mL (21 @ 15:38)      CARDIAC MARKERS ( 2021 15:38 )  x     / <0.01 ng/mL / x     / x     / x          RADIOLOGY:  < from: Xray Chest 1 View- PORTABLE-Urgent (Xray Chest 1 View- PORTABLE-Urgent .) (21 @ 13:28) >  Impression:    No radiographic evidence of acute cardiopulmonary disease.    < end of copied text >      PHYSICAL EXAM:  GEN: Seems to be uncomfortable  LUNGS: Clear to auscultation bilaterally   HEART: S1/S2 present. RRR.   Skin: Many spots with discoloration present.  NEURO: AAOX3    Intravenous access:   NG tube:   Soto Catheter:         
DACIA CASTAÑEDA    Patient is a 25y old  Female who presents with a chief complaint of chest pain (2021 19:10)    INTERVAL HPI/OVERNIGHT EVENTS: pt still complaints of pain in chest, left arm pain, no SOB. +Pruritus all over the body. no fever, no cough, no dysuria.     ROS: All ROS negative except as documented above     PHYSICAL EXAM:  T(C): 35.7, Max: 37 (21 @ 22:24)  HR: 97 (70 - 97)  BP: 124/74 (112/71 - 124/74)  RR: 18 (17 - 18)  SpO2: 94% (94% - 100%)    GENERAL: NAD, looks drowsy  PULMONARY/CHEST: No rales, rhonchi, wheezing  CARDIOVASC: Regular rate and rhythm; No murmurs  GI/ABDOMEN: Soft, Nontender, Nondistended; Bowel sounds present  EXTREMITIES: LE no edema   SKIN: +b/l LE and UE with flat top hyperpigmented rash  NERVOUS SYSTEM:  Alert & Oriented X3, no focal deficit     LABS:                        6.4    19.51 )-----------( 250      ( 2021 10:59 )             18.4     07-08    138  |  106  |  9<L>  ----------------------------<  70  3.8   |  25  |  0.5<L>    Ca    8.8      2021 10:59  Mg     1.8     07-    TPro  7.0  /  Alb  4.2  /  TBili  3.8<H>  /  DBili  x   /  AST  26  /  ALT  11  /  AlkPhos  68  07-08      Urinalysis Basic - ( 2021 12:40 )    Color: Yellow / Appearance: Clear / S.009 / pH: x  Gluc: x / Ketone: Negative  / Bili: Negative / Urobili: 3 mg/dL   Blood: x / Protein: Negative / Nitrite: Negative   Leuk Esterase: Negative / RBC: x / WBC x   Sq Epi: x / Non Sq Epi: x / Bacteria: x      RADIOLOGY & ADDITIONAL TESTS:  < from: Xray Chest 1 View- PORTABLE-Routine (Xray Chest 1 View- PORTABLE-Routine in AM.) (21 @ 08:51) >  Impression:    Low lung volumes leads to magnification of the pulmonary interstitium.    Support devices as described.    < end of copied text >        MEDICATIONS  (STANDING):  acetaminophen   Tablet .. 650 milliGRAM(s) Oral every 6 hours  apixaban 5 milliGRAM(s) Oral every 12 hours  dextrose 5% + sodium chloride 0.45%. 1000 milliLiter(s) (200 mL/Hr) IV Continuous <Continuous>  folic acid 1 milliGRAM(s) Oral daily  hydroxyurea 1500 milliGRAM(s) Oral daily  senna 2 Tablet(s) Oral at bedtime    MEDICATIONS  (PRN):  diphenhydrAMINE   Injectable 25 milliGRAM(s) IV Push every 4 hours PRN Itching  HYDROmorphone  Injectable 2 milliGRAM(s) IV Push every 4 hours PRN Pain  ketorolac   Injectable 15 milliGRAM(s) IV Push every 6 hours PRN Moderate Pain (4 - 6)

## 2021-07-09 NOTE — PROGRESS NOTE ADULT - ASSESSMENT
26 yo F pt with sickle cell disease (reports history of monthly flare-ups and was admitted at [facility] in [place] ~ 1 mth ago) presented with pain in her chest and arm for one day.     # Acute sickle cell pain crisis. As of now no acute chest pain syndrome. - improving  - CXR with no infiltrates  - pt is on room air  - Hb 6.5, retic count and LDH stable   - can dc Dilaudid, pt states pain is better  - pt does not want to stay until heme/onc eval  - cont Hydrea with Folate supplement     # leucocytosis, most likely reactive - no clinical signs of infection, no fever, ROS negative, no ABx for now    # Hx of pulmonary embolism on apixaban  - cont 5 mg BID    # Iron overload due to transfusions - Iron studies noted, primary hematologist to make a decision about chelators.     Pt can be discharged home today since she insist.

## 2021-07-19 DIAGNOSIS — R07.9 CHEST PAIN, UNSPECIFIED: ICD-10-CM

## 2021-07-19 DIAGNOSIS — D57.00 HB-SS DISEASE WITH CRISIS, UNSPECIFIED: ICD-10-CM

## 2021-07-19 DIAGNOSIS — Z79.01 LONG TERM (CURRENT) USE OF ANTICOAGULANTS: ICD-10-CM

## 2021-07-19 DIAGNOSIS — D72.829 ELEVATED WHITE BLOOD CELL COUNT, UNSPECIFIED: ICD-10-CM

## 2021-07-19 DIAGNOSIS — Z86.711 PERSONAL HISTORY OF PULMONARY EMBOLISM: ICD-10-CM

## 2021-12-11 ENCOUNTER — EMERGENCY (EMERGENCY)
Facility: HOSPITAL | Age: 26
LOS: 1 days | Discharge: HOME | End: 2021-12-11
Attending: STUDENT IN AN ORGANIZED HEALTH CARE EDUCATION/TRAINING PROGRAM
Payer: MEDICARE

## 2021-12-11 VITALS
HEIGHT: 65 IN | TEMPERATURE: 99 F | RESPIRATION RATE: 16 BRPM | HEART RATE: 74 BPM | OXYGEN SATURATION: 98 % | WEIGHT: 148.59 LBS

## 2021-12-11 VITALS
OXYGEN SATURATION: 99 % | HEART RATE: 84 BPM | TEMPERATURE: 98 F | SYSTOLIC BLOOD PRESSURE: 106 MMHG | DIASTOLIC BLOOD PRESSURE: 71 MMHG | RESPIRATION RATE: 18 BRPM

## 2021-12-11 DIAGNOSIS — Z90.81 ACQUIRED ABSENCE OF SPLEEN: Chronic | ICD-10-CM

## 2021-12-11 DIAGNOSIS — Z90.49 ACQUIRED ABSENCE OF OTHER SPECIFIED PARTS OF DIGESTIVE TRACT: Chronic | ICD-10-CM

## 2021-12-11 LAB
ALBUMIN SERPL ELPH-MCNC: 3.6 G/DL — SIGNIFICANT CHANGE UP (ref 3.5–5)
ALP SERPL-CCNC: 86 U/L — SIGNIFICANT CHANGE UP (ref 40–120)
ALT FLD-CCNC: 36 U/L DA — SIGNIFICANT CHANGE UP (ref 10–60)
ANION GAP SERPL CALC-SCNC: 6 MMOL/L — SIGNIFICANT CHANGE UP (ref 5–17)
ANISOCYTOSIS BLD QL: SLIGHT — SIGNIFICANT CHANGE UP
APPEARANCE UR: CLEAR — SIGNIFICANT CHANGE UP
AST SERPL-CCNC: 33 U/L — SIGNIFICANT CHANGE UP (ref 10–40)
BACTERIA # UR AUTO: ABNORMAL /HPF
BASOPHILS # BLD AUTO: 0.12 K/UL — SIGNIFICANT CHANGE UP (ref 0–0.2)
BASOPHILS NFR BLD AUTO: 0.9 % — SIGNIFICANT CHANGE UP (ref 0–2)
BILIRUB SERPL-MCNC: 2 MG/DL — HIGH (ref 0.2–1.2)
BILIRUB UR-MCNC: NEGATIVE — SIGNIFICANT CHANGE UP
BUN SERPL-MCNC: 8 MG/DL — SIGNIFICANT CHANGE UP (ref 7–18)
CALCIUM SERPL-MCNC: 8.8 MG/DL — SIGNIFICANT CHANGE UP (ref 8.4–10.5)
CHLORIDE SERPL-SCNC: 104 MMOL/L — SIGNIFICANT CHANGE UP (ref 96–108)
CO2 SERPL-SCNC: 27 MMOL/L — SIGNIFICANT CHANGE UP (ref 22–31)
COLOR SPEC: YELLOW — SIGNIFICANT CHANGE UP
CREAT SERPL-MCNC: 0.5 MG/DL — SIGNIFICANT CHANGE UP (ref 0.5–1.3)
DIFF PNL FLD: NEGATIVE — SIGNIFICANT CHANGE UP
ELLIPTOCYTES BLD QL SMEAR: SLIGHT — SIGNIFICANT CHANGE UP
EOSINOPHIL # BLD AUTO: 0.72 K/UL — HIGH (ref 0–0.5)
EOSINOPHIL NFR BLD AUTO: 5.1 % — SIGNIFICANT CHANGE UP (ref 0–6)
EPI CELLS # UR: ABNORMAL /HPF
GLUCOSE SERPL-MCNC: 84 MG/DL — SIGNIFICANT CHANGE UP (ref 70–99)
GLUCOSE UR QL: NEGATIVE — SIGNIFICANT CHANGE UP
HCG SERPL-ACNC: <1 MIU/ML — SIGNIFICANT CHANGE UP
HCG UR QL: NEGATIVE — SIGNIFICANT CHANGE UP
HCT VFR BLD CALC: 22.5 % — LOW (ref 34.5–45)
HGB BLD-MCNC: 7.6 G/DL — LOW (ref 11.5–15.5)
HYPOCHROMIA BLD QL: SLIGHT — SIGNIFICANT CHANGE UP
IMM GRANULOCYTES NFR BLD AUTO: 0.4 % — SIGNIFICANT CHANGE UP (ref 0–1.5)
KETONES UR-MCNC: NEGATIVE — SIGNIFICANT CHANGE UP
LDH SERPL L TO P-CCNC: 303 U/L — HIGH (ref 120–225)
LEUKOCYTE ESTERASE UR-ACNC: ABNORMAL
LYMPHOCYTES # BLD AUTO: 36.7 % — SIGNIFICANT CHANGE UP (ref 13–44)
LYMPHOCYTES # BLD AUTO: 5.17 K/UL — HIGH (ref 1–3.3)
MACROCYTES BLD QL: SLIGHT — SIGNIFICANT CHANGE UP
MANUAL SMEAR VERIFICATION: SIGNIFICANT CHANGE UP
MCHC RBC-ENTMCNC: 32.6 PG — SIGNIFICANT CHANGE UP (ref 27–34)
MCHC RBC-ENTMCNC: 33.8 GM/DL — SIGNIFICANT CHANGE UP (ref 32–36)
MCV RBC AUTO: 96.6 FL — SIGNIFICANT CHANGE UP (ref 80–100)
MONOCYTES # BLD AUTO: 1.05 K/UL — HIGH (ref 0–0.9)
MONOCYTES NFR BLD AUTO: 7.4 % — SIGNIFICANT CHANGE UP (ref 2–14)
NEUTROPHILS # BLD AUTO: 6.99 K/UL — SIGNIFICANT CHANGE UP (ref 1.8–7.4)
NEUTROPHILS NFR BLD AUTO: 49.5 % — SIGNIFICANT CHANGE UP (ref 43–77)
NITRITE UR-MCNC: NEGATIVE — SIGNIFICANT CHANGE UP
NRBC # BLD: 4 /100 WBCS — HIGH (ref 0–0)
PH UR: 6.5 — SIGNIFICANT CHANGE UP (ref 5–8)
PLAT MORPH BLD: NORMAL — SIGNIFICANT CHANGE UP
PLATELET # BLD AUTO: 427 K/UL — HIGH (ref 150–400)
PLATELET COUNT - ESTIMATE: NORMAL — SIGNIFICANT CHANGE UP
POIKILOCYTOSIS BLD QL AUTO: SLIGHT — SIGNIFICANT CHANGE UP
POLYCHROMASIA BLD QL SMEAR: SLIGHT — SIGNIFICANT CHANGE UP
POTASSIUM SERPL-MCNC: 3.2 MMOL/L — LOW (ref 3.5–5.3)
POTASSIUM SERPL-SCNC: 3.2 MMOL/L — LOW (ref 3.5–5.3)
PROT SERPL-MCNC: 8 G/DL — SIGNIFICANT CHANGE UP (ref 6–8.3)
PROT UR-MCNC: 15
RBC # BLD: 2.33 M/UL — LOW (ref 3.8–5.2)
RBC # BLD: 2.33 M/UL — LOW (ref 3.8–5.2)
RBC # FLD: 18.6 % — HIGH (ref 10.3–14.5)
RBC BLD AUTO: ABNORMAL
RBC CASTS # UR COMP ASSIST: SIGNIFICANT CHANGE UP /HPF (ref 0–2)
RETICS #: 336.9 K/UL — HIGH (ref 25–125)
RETICS/RBC NFR: 14.5 % — HIGH (ref 0.5–2.5)
SODIUM SERPL-SCNC: 137 MMOL/L — SIGNIFICANT CHANGE UP (ref 135–145)
SP GR SPEC: 1.01 — SIGNIFICANT CHANGE UP (ref 1.01–1.02)
UROBILINOGEN FLD QL: 4
WBC # BLD: 14.1 K/UL — HIGH (ref 3.8–10.5)
WBC # FLD AUTO: 14.1 K/UL — HIGH (ref 3.8–10.5)
WBC UR QL: SIGNIFICANT CHANGE UP /HPF (ref 0–5)

## 2021-12-11 PROCEDURE — 85025 COMPLETE CBC W/AUTO DIFF WBC: CPT

## 2021-12-11 PROCEDURE — 81025 URINE PREGNANCY TEST: CPT

## 2021-12-11 PROCEDURE — 85045 AUTOMATED RETICULOCYTE COUNT: CPT

## 2021-12-11 PROCEDURE — 99284 EMERGENCY DEPT VISIT MOD MDM: CPT | Mod: 25

## 2021-12-11 PROCEDURE — 96374 THER/PROPH/DIAG INJ IV PUSH: CPT

## 2021-12-11 PROCEDURE — 84702 CHORIONIC GONADOTROPIN TEST: CPT

## 2021-12-11 PROCEDURE — 96376 TX/PRO/DX INJ SAME DRUG ADON: CPT

## 2021-12-11 PROCEDURE — 83615 LACTATE (LD) (LDH) ENZYME: CPT

## 2021-12-11 PROCEDURE — 81001 URINALYSIS AUTO W/SCOPE: CPT

## 2021-12-11 PROCEDURE — 71045 X-RAY EXAM CHEST 1 VIEW: CPT

## 2021-12-11 PROCEDURE — 96361 HYDRATE IV INFUSION ADD-ON: CPT

## 2021-12-11 PROCEDURE — 36415 COLL VENOUS BLD VENIPUNCTURE: CPT

## 2021-12-11 PROCEDURE — 99284 EMERGENCY DEPT VISIT MOD MDM: CPT

## 2021-12-11 PROCEDURE — 71045 X-RAY EXAM CHEST 1 VIEW: CPT | Mod: 26

## 2021-12-11 PROCEDURE — 80053 COMPREHEN METABOLIC PANEL: CPT

## 2021-12-11 RX ORDER — POTASSIUM CHLORIDE 20 MEQ
40 PACKET (EA) ORAL ONCE
Refills: 0 | Status: COMPLETED | OUTPATIENT
Start: 2021-12-11 | End: 2021-12-11

## 2021-12-11 RX ORDER — SODIUM CHLORIDE 9 MG/ML
1000 INJECTION INTRAMUSCULAR; INTRAVENOUS; SUBCUTANEOUS ONCE
Refills: 0 | Status: COMPLETED | OUTPATIENT
Start: 2021-12-11 | End: 2021-12-11

## 2021-12-11 RX ORDER — HYDROMORPHONE HYDROCHLORIDE 2 MG/ML
8 INJECTION INTRAMUSCULAR; INTRAVENOUS; SUBCUTANEOUS ONCE
Refills: 0 | Status: DISCONTINUED | OUTPATIENT
Start: 2021-12-11 | End: 2021-12-11

## 2021-12-11 RX ORDER — MORPHINE SULFATE 50 MG/1
4 CAPSULE, EXTENDED RELEASE ORAL ONCE
Refills: 0 | Status: DISCONTINUED | OUTPATIENT
Start: 2021-12-11 | End: 2021-12-11

## 2021-12-11 RX ORDER — CEPHALEXIN 500 MG
1 CAPSULE ORAL
Qty: 10 | Refills: 0
Start: 2021-12-11 | End: 2021-12-15

## 2021-12-11 RX ORDER — CEPHALEXIN 500 MG
500 CAPSULE ORAL ONCE
Refills: 0 | Status: COMPLETED | OUTPATIENT
Start: 2021-12-11 | End: 2021-12-11

## 2021-12-11 RX ADMIN — Medication 40 MILLIEQUIVALENT(S): at 10:55

## 2021-12-11 RX ADMIN — SODIUM CHLORIDE 1000 MILLILITER(S): 9 INJECTION INTRAMUSCULAR; INTRAVENOUS; SUBCUTANEOUS at 04:57

## 2021-12-11 RX ADMIN — MORPHINE SULFATE 4 MILLIGRAM(S): 50 CAPSULE, EXTENDED RELEASE ORAL at 08:51

## 2021-12-11 RX ADMIN — SODIUM CHLORIDE 1000 MILLILITER(S): 9 INJECTION INTRAMUSCULAR; INTRAVENOUS; SUBCUTANEOUS at 08:51

## 2021-12-11 RX ADMIN — MORPHINE SULFATE 4 MILLIGRAM(S): 50 CAPSULE, EXTENDED RELEASE ORAL at 06:48

## 2021-12-11 RX ADMIN — SODIUM CHLORIDE 1000 MILLILITER(S): 9 INJECTION INTRAMUSCULAR; INTRAVENOUS; SUBCUTANEOUS at 03:57

## 2021-12-11 RX ADMIN — Medication 500 MILLIGRAM(S): at 11:03

## 2021-12-11 RX ADMIN — HYDROMORPHONE HYDROCHLORIDE 8 MILLIGRAM(S): 2 INJECTION INTRAMUSCULAR; INTRAVENOUS; SUBCUTANEOUS at 11:03

## 2021-12-11 NOTE — ED PROVIDER NOTE - PROGRESS NOTE DETAILS
labs noted, incr abs retic count, slightly elevated ldh, wbc elevated  pt now amenable to giving urine, awaiting that  cxr pending. pt refused covid test, states she is vaccinated.     pt had refused apap, toradol, ketamine. morphine given, which improved pain, however she cont to experience diffuse pain.   pt offered admission given elevated wbc, pain crisis requiring opiate med management, however she states she wants to talk to her mom first and is unsure about staying at this time.   therefore, will administer addtnl IVF, obtain ua, cxr and reassess pain.   pt endorsed to oncoming attending for further management. patient signed out to me by Dr. Morris pending lab results. patient updated on results. still has pain and offered admission but patient declines stating she is tired of being in hospitals. ua may be contaminated but given elevated white count while treat. patient given strict return precautions. Moses Mari

## 2021-12-11 NOTE — ED PROVIDER NOTE - CLINICAL SUMMARY MEDICAL DECISION MAKING FREE TEXT BOX
pt pmhx scd p/w pain crisis, diffuse body pain  no f/c, cp, sob, cough - unlikely acute chest, pt satting well ra, appears comfortable  on dilaudid at home, will gradually incr pain control meds, though pt refusing most meds  labs, ua, cxr, ivf, pain control, reassess

## 2021-12-11 NOTE — ED ADULT TRIAGE NOTE - CHIEF COMPLAINT QUOTE
As per pt, c/o generalized body pains worsening at BL legs s/p sickle cell flare up x1 week. LMP12/03/2021

## 2021-12-11 NOTE — ED PROVIDER NOTE - NSFOLLOWUPINSTRUCTIONS_ED_ALL_ED_FT
You were seen in the emergency department for sickle cell pain crisis.     Please follow-up with your primary care doctor or your hematologist in the next 24-48 hours.     If you have any worsening symptoms, severe chest pain, difficulty breathing, or you have pain you cannot control, please return to the emergency department.

## 2021-12-11 NOTE — ED PROVIDER NOTE - PATIENT PORTAL LINK FT
You can access the FollowMyHealth Patient Portal offered by [facility] by registering at the following website: [email or web address]. By joining [employer]’s FollowMyHealth portal, you will also be able to view your health information using other applications (apps) compatible with our system.

## 2021-12-11 NOTE — ED PROVIDER NOTE - NS ED ROS FT
(+) body aches in b/l ext, back  (-) fevers, chills  (-) dizziness, lightheadedness, vision changes  (-) neck pain, stiffness  (-) cp, palpitations  (-) sob, cough, hemoptysis  (-) abd pain, n/v/d/c   (-) urinary sxs, back pain  (-) weakness, paresthesias

## 2021-12-11 NOTE — ED PROVIDER NOTE - OBJECTIVE STATEMENT
27yo F pmhx SCD p/w body pain. Patient states that she was recently discharged from OSH for sickle cell pain crisis approx 1 week ago and was feeling continued pain and discomfort during the week. Per patient, she takes dilaudid 8mg at home, but it did not help and today her sister made her come into the hospital for continued pain. Patient endorsing aching back, legs and arm pain, which is typical of her previous crises. Patient denies cp, sob, cough, f/c, abd pain, n/v/d/c, urinary sxs otherwise.

## 2021-12-11 NOTE — ED PROVIDER NOTE - PHYSICAL EXAMINATION
Sohrawardy:   VS reviewed  NAD, not ill-appearing  aaox3  rrr  normal resp effort  lungs ctab, no w/r/r  abdomen soft, nd/nt   no cva tenderness b/l  no neuro deficits

## 2021-12-13 PROBLEM — D57.1 SICKLE-CELL DISEASE WITHOUT CRISIS: Chronic | Status: ACTIVE | Noted: 2021-07-07

## 2022-01-27 ENCOUNTER — EMERGENCY (EMERGENCY)
Facility: HOSPITAL | Age: 27
LOS: 1 days | Discharge: HOME | End: 2022-01-27
Attending: EMERGENCY MEDICINE
Payer: MEDICARE

## 2022-01-27 VITALS
RESPIRATION RATE: 18 BRPM | TEMPERATURE: 98 F | DIASTOLIC BLOOD PRESSURE: 62 MMHG | OXYGEN SATURATION: 100 % | SYSTOLIC BLOOD PRESSURE: 101 MMHG | HEART RATE: 79 BPM

## 2022-01-27 VITALS
TEMPERATURE: 98 F | RESPIRATION RATE: 16 BRPM | HEART RATE: 89 BPM | DIASTOLIC BLOOD PRESSURE: 70 MMHG | OXYGEN SATURATION: 98 % | SYSTOLIC BLOOD PRESSURE: 103 MMHG | HEIGHT: 65 IN | WEIGHT: 147.71 LBS

## 2022-01-27 DIAGNOSIS — Z90.49 ACQUIRED ABSENCE OF OTHER SPECIFIED PARTS OF DIGESTIVE TRACT: Chronic | ICD-10-CM

## 2022-01-27 DIAGNOSIS — Z90.81 ACQUIRED ABSENCE OF SPLEEN: Chronic | ICD-10-CM

## 2022-01-27 LAB
ALBUMIN SERPL ELPH-MCNC: 3.7 G/DL — SIGNIFICANT CHANGE UP (ref 3.5–5)
ALP SERPL-CCNC: 92 U/L — SIGNIFICANT CHANGE UP (ref 40–120)
ALT FLD-CCNC: 19 U/L DA — SIGNIFICANT CHANGE UP (ref 10–60)
ANION GAP SERPL CALC-SCNC: 6 MMOL/L — SIGNIFICANT CHANGE UP (ref 5–17)
AST SERPL-CCNC: 19 U/L — SIGNIFICANT CHANGE UP (ref 10–40)
BASOPHILS # BLD AUTO: 0.09 K/UL — SIGNIFICANT CHANGE UP (ref 0–0.2)
BASOPHILS NFR BLD AUTO: 0.8 % — SIGNIFICANT CHANGE UP (ref 0–2)
BILIRUB SERPL-MCNC: 1.4 MG/DL — HIGH (ref 0.2–1.2)
BUN SERPL-MCNC: 9 MG/DL — SIGNIFICANT CHANGE UP (ref 7–18)
CALCIUM SERPL-MCNC: 8.4 MG/DL — SIGNIFICANT CHANGE UP (ref 8.4–10.5)
CHLORIDE SERPL-SCNC: 105 MMOL/L — SIGNIFICANT CHANGE UP (ref 96–108)
CO2 SERPL-SCNC: 27 MMOL/L — SIGNIFICANT CHANGE UP (ref 22–31)
CREAT SERPL-MCNC: 0.62 MG/DL — SIGNIFICANT CHANGE UP (ref 0.5–1.3)
EOSINOPHIL # BLD AUTO: 0.5 K/UL — SIGNIFICANT CHANGE UP (ref 0–0.5)
EOSINOPHIL NFR BLD AUTO: 4.7 % — SIGNIFICANT CHANGE UP (ref 0–6)
GLUCOSE SERPL-MCNC: 95 MG/DL — SIGNIFICANT CHANGE UP (ref 70–99)
HCG SERPL-ACNC: <1 MIU/ML — SIGNIFICANT CHANGE UP
HCT VFR BLD CALC: 28.3 % — LOW (ref 34.5–45)
HGB BLD-MCNC: 9.7 G/DL — LOW (ref 11.5–15.5)
IMM GRANULOCYTES NFR BLD AUTO: 0.3 % — SIGNIFICANT CHANGE UP (ref 0–1.5)
LYMPHOCYTES # BLD AUTO: 3.87 K/UL — HIGH (ref 1–3.3)
LYMPHOCYTES # BLD AUTO: 36.2 % — SIGNIFICANT CHANGE UP (ref 13–44)
MAGNESIUM SERPL-MCNC: 1.9 MG/DL — SIGNIFICANT CHANGE UP (ref 1.6–2.6)
MCHC RBC-ENTMCNC: 31.6 PG — SIGNIFICANT CHANGE UP (ref 27–34)
MCHC RBC-ENTMCNC: 34.3 GM/DL — SIGNIFICANT CHANGE UP (ref 32–36)
MCV RBC AUTO: 92.2 FL — SIGNIFICANT CHANGE UP (ref 80–100)
MONOCYTES # BLD AUTO: 0.76 K/UL — SIGNIFICANT CHANGE UP (ref 0–0.9)
MONOCYTES NFR BLD AUTO: 7.1 % — SIGNIFICANT CHANGE UP (ref 2–14)
NEUTROPHILS # BLD AUTO: 5.43 K/UL — SIGNIFICANT CHANGE UP (ref 1.8–7.4)
NEUTROPHILS NFR BLD AUTO: 50.9 % — SIGNIFICANT CHANGE UP (ref 43–77)
NRBC # BLD: 0 /100 WBCS — SIGNIFICANT CHANGE UP (ref 0–0)
PLATELET # BLD AUTO: 597 K/UL — HIGH (ref 150–400)
POTASSIUM SERPL-MCNC: 3.7 MMOL/L — SIGNIFICANT CHANGE UP (ref 3.5–5.3)
POTASSIUM SERPL-SCNC: 3.7 MMOL/L — SIGNIFICANT CHANGE UP (ref 3.5–5.3)
PROT SERPL-MCNC: 7.7 G/DL — SIGNIFICANT CHANGE UP (ref 6–8.3)
RBC # BLD: 3.07 M/UL — LOW (ref 3.8–5.2)
RBC # BLD: 3.07 M/UL — LOW (ref 3.8–5.2)
RBC # FLD: 17.5 % — HIGH (ref 10.3–14.5)
RETICS #: 132.3 K/UL — HIGH (ref 25–125)
RETICS/RBC NFR: 4.3 % — HIGH (ref 0.5–2.5)
SODIUM SERPL-SCNC: 138 MMOL/L — SIGNIFICANT CHANGE UP (ref 135–145)
WBC # BLD: 10.68 K/UL — HIGH (ref 3.8–10.5)
WBC # FLD AUTO: 10.68 K/UL — HIGH (ref 3.8–10.5)

## 2022-01-27 PROCEDURE — 36000 PLACE NEEDLE IN VEIN: CPT

## 2022-01-27 PROCEDURE — 99284 EMERGENCY DEPT VISIT MOD MDM: CPT | Mod: 25

## 2022-01-27 RX ORDER — DIPHENHYDRAMINE HCL 50 MG
25 CAPSULE ORAL ONCE
Refills: 0 | Status: COMPLETED | OUTPATIENT
Start: 2022-01-27 | End: 2022-01-27

## 2022-01-27 RX ORDER — HYDROMORPHONE HYDROCHLORIDE 2 MG/ML
8 INJECTION INTRAMUSCULAR; INTRAVENOUS; SUBCUTANEOUS ONCE
Refills: 0 | Status: DISCONTINUED | OUTPATIENT
Start: 2022-01-27 | End: 2022-01-27

## 2022-01-27 RX ORDER — METOCLOPRAMIDE HCL 10 MG
10 TABLET ORAL ONCE
Refills: 0 | Status: COMPLETED | OUTPATIENT
Start: 2022-01-27 | End: 2022-01-27

## 2022-01-27 RX ORDER — IBUPROFEN 200 MG
600 TABLET ORAL ONCE
Refills: 0 | Status: COMPLETED | OUTPATIENT
Start: 2022-01-27 | End: 2022-01-27

## 2022-01-27 RX ORDER — ONDANSETRON 8 MG/1
4 TABLET, FILM COATED ORAL ONCE
Refills: 0 | Status: COMPLETED | OUTPATIENT
Start: 2022-01-27 | End: 2022-01-27

## 2022-01-27 RX ORDER — SODIUM CHLORIDE 9 MG/ML
1000 INJECTION INTRAMUSCULAR; INTRAVENOUS; SUBCUTANEOUS ONCE
Refills: 0 | Status: COMPLETED | OUTPATIENT
Start: 2022-01-27 | End: 2022-01-27

## 2022-01-27 RX ORDER — HYDROMORPHONE HYDROCHLORIDE 2 MG/ML
4 INJECTION INTRAMUSCULAR; INTRAVENOUS; SUBCUTANEOUS ONCE
Refills: 0 | Status: DISCONTINUED | OUTPATIENT
Start: 2022-01-27 | End: 2022-01-27

## 2022-01-27 RX ORDER — SODIUM CHLORIDE 9 MG/ML
1000 INJECTION INTRAMUSCULAR; INTRAVENOUS; SUBCUTANEOUS
Refills: 0 | Status: DISCONTINUED | OUTPATIENT
Start: 2022-01-27 | End: 2022-01-31

## 2022-01-27 RX ADMIN — HYDROMORPHONE HYDROCHLORIDE 8 MILLIGRAM(S): 2 INJECTION INTRAMUSCULAR; INTRAVENOUS; SUBCUTANEOUS at 20:07

## 2022-01-27 RX ADMIN — Medication 25 MILLIGRAM(S): at 22:09

## 2022-01-27 RX ADMIN — SODIUM CHLORIDE 1000 MILLILITER(S): 9 INJECTION INTRAMUSCULAR; INTRAVENOUS; SUBCUTANEOUS at 21:17

## 2022-01-27 RX ADMIN — SODIUM CHLORIDE 1000 MILLILITER(S): 9 INJECTION INTRAMUSCULAR; INTRAVENOUS; SUBCUTANEOUS at 22:12

## 2022-01-27 RX ADMIN — HYDROMORPHONE HYDROCHLORIDE 4 MILLIGRAM(S): 2 INJECTION INTRAMUSCULAR; INTRAVENOUS; SUBCUTANEOUS at 22:09

## 2022-01-27 RX ADMIN — HYDROMORPHONE HYDROCHLORIDE 8 MILLIGRAM(S): 2 INJECTION INTRAMUSCULAR; INTRAVENOUS; SUBCUTANEOUS at 21:48

## 2022-01-27 RX ADMIN — ONDANSETRON 4 MILLIGRAM(S): 8 TABLET, FILM COATED ORAL at 21:17

## 2022-01-27 RX ADMIN — SODIUM CHLORIDE 150 MILLILITER(S): 9 INJECTION INTRAMUSCULAR; INTRAVENOUS; SUBCUTANEOUS at 22:12

## 2022-01-27 RX ADMIN — Medication 600 MILLIGRAM(S): at 22:09

## 2022-01-27 RX ADMIN — Medication 25 MILLIGRAM(S): at 20:07

## 2022-01-27 RX ADMIN — Medication 10 MILLIGRAM(S): at 22:09

## 2022-01-27 NOTE — ED PROVIDER NOTE - MUSCULOSKELETAL, MLM
Spine appears normal, range of motion is not limited, mid lower back- tenderness to palp., no CVAT, augusto thighs-sl tenderness to palp., no swelling

## 2022-01-27 NOTE — ED PROVIDER NOTE - OBJECTIVE STATEMENT
26 y.o. female LMP 1/1, pt c/o lower back pain, augusto arm/leg pain for past few days, no fever, n/v, pt has been taking Dilaudid 8mg with no relief, last dose 5pm.  Pt was last hospitalized <1 week ago for 17 days for sickle cell crisis

## 2022-01-27 NOTE — ED PROVIDER NOTE - PATIENT PORTAL LINK FT
You can access the FollowMyHealth Patient Portal offered by [facility] by registering at the following website: [email or web address]. By joining [employer]’s FollowMyHealth portal, you will also be able to view your health information using other applications (apps) compatible with our system.

## 2022-01-27 NOTE — ED PROVIDER NOTE - PROGRESS NOTE DETAILS
Pt's feeling better, still c/o leg pain, will give Dilaudid.  sister who works @  is by bedside, will take pt home.  Pt expresses that she want to go home

## 2022-01-28 PROCEDURE — 85045 AUTOMATED RETICULOCYTE COUNT: CPT

## 2022-01-28 PROCEDURE — 85025 COMPLETE CBC W/AUTO DIFF WBC: CPT

## 2022-01-28 PROCEDURE — 96361 HYDRATE IV INFUSION ADD-ON: CPT

## 2022-01-28 PROCEDURE — 84702 CHORIONIC GONADOTROPIN TEST: CPT

## 2022-01-28 PROCEDURE — 99284 EMERGENCY DEPT VISIT MOD MDM: CPT | Mod: 25

## 2022-01-28 PROCEDURE — 36415 COLL VENOUS BLD VENIPUNCTURE: CPT

## 2022-01-28 PROCEDURE — 83735 ASSAY OF MAGNESIUM: CPT

## 2022-01-28 PROCEDURE — 96374 THER/PROPH/DIAG INJ IV PUSH: CPT

## 2022-01-28 PROCEDURE — 80053 COMPREHEN METABOLIC PANEL: CPT

## 2022-01-28 RX ORDER — HYDROMORPHONE HYDROCHLORIDE 2 MG/ML
4 INJECTION INTRAMUSCULAR; INTRAVENOUS; SUBCUTANEOUS ONCE
Refills: 0 | Status: DISCONTINUED | OUTPATIENT
Start: 2022-01-28 | End: 2022-01-28

## 2022-01-28 RX ADMIN — Medication 600 MILLIGRAM(S): at 00:29

## 2022-01-28 RX ADMIN — HYDROMORPHONE HYDROCHLORIDE 4 MILLIGRAM(S): 2 INJECTION INTRAMUSCULAR; INTRAVENOUS; SUBCUTANEOUS at 00:34

## 2022-01-28 RX ADMIN — HYDROMORPHONE HYDROCHLORIDE 4 MILLIGRAM(S): 2 INJECTION INTRAMUSCULAR; INTRAVENOUS; SUBCUTANEOUS at 00:29

## 2022-01-28 RX ADMIN — SODIUM CHLORIDE 1000 MILLILITER(S): 9 INJECTION INTRAMUSCULAR; INTRAVENOUS; SUBCUTANEOUS at 00:31

## 2022-05-06 NOTE — DISCHARGE NOTE NURSING/CASE MANAGEMENT/SOCIAL WORK - NSCORESITESY/N_GEN_A_CORE_RD
No [facility]     Emergency Department     Faculty Attestation    I performed a history and physical examination of the patient and discussed management with the resident. I reviewed the residents note and agree with the documented findings including all diagnostic interpretations and plan of care. Any areas of disagreement are noted on the chart. I was personally present for the key portions of any procedures. I have documented in the chart those procedures where I was not present during the key portions. I have reviewed the emergency nurses triage note. I agree with the chief complaint, past medical history, past surgical history, allergies, medications, social and family history as documented unless otherwise noted below. Documentation of the HPI, Physical Exam and Medical Decision Making performed by scribes is based on my personal performance of the HPI, PE and MDM. For Physician Assistant/ Nurse Practitioner cases/documentation I have personally evaluated this patient and have completed at least one if not all key elements of the E/M (history, physical exam, and MDM). Additional findings are as noted. This patient was evaluated in the Emergency Department for symptoms described in the history of present illness. He/she was evaluated in the context of the global COVID-19 pandemic, which necessitated consideration that the patient might be at risk for infection with the SARS-CoV-2 virus that causes COVID-19. Institutional protocols and algorithms that pertain to the evaluation of patients at risk for COVID-19 are in a state of rapid change based on information released by regulatory bodies including the CDC and federal and state organizations. These policies and algorithms were followed during the patient's care in the ED. Primary Care Physician: Mary Cesar, DARRIAN - CNP    History:  This is a 24 y.o. female who presents to the Emergency Department with complaint of nausea and vomiting. Some abdominal cramping. Is currently 6 weeks pregnant. Has not had confirmatory ultrasound testing. Had significant issues with with hyperemesis gravidarum during previous pregnancy. Physical:     height is 5' 3\" (1.6 m) and weight is 148 lb (67.1 kg). Her oral temperature is 98.1 °F (36.7 °C). Her blood pressure is 92/52 (abnormal) and her pulse is 62. Her respiration is 16 and oxygen saturation is 98%.    24 y.o. female no acute distress, cardiac exam regular rate and rhythm no murmurs rubs gallops, pulmonary clear bilaterally abdomen is soft nontender no rebound no guarding.     Impression: Nausea and vomiting in first trimester pregnancy    Plan: Ultrasound to confirm IUP, labs, fluids, symptomatic treatment      Padmini Kraft MD, James Meraz  Attending Emergency Physician         Luisa Rivera MD  05/06/22 [id number]

## 2022-11-10 NOTE — PATIENT PROFILE ADULT - NSPROMEDSBROUGHTTOHOSP_GEN_A_NUR
Pt given DC instructions and where to  RX's. Pt verbalized understanding.  DC Home in stable condition     Janes Quiñones RN  11/09/22 1931
no

## 2023-06-14 NOTE — ED ADULT TRIAGE NOTE - RESPIRATORY RATE (BREATHS/MIN)
[No Acute Distress] : no acute distress [Normal Oropharynx] : normal oropharynx [Normal Appearance] : normal appearance [No Neck Mass] : no neck mass [Normal Rate/Rhythm] : normal rate/rhythm [Normal S1, S2] : normal s1, s2 [No Murmurs] : no murmurs [No Resp Distress] : no resp distress [Clear to Auscultation Bilaterally] : clear to auscultation bilaterally [No Abnormalities] : no abnormalities [Benign] : benign [Normal Gait] : normal gait [No Clubbing] : no clubbing [No Cyanosis] : no cyanosis 16 [No Edema] : no edema [FROM] : FROM [Normal Color/ Pigmentation] : normal color/ pigmentation [No Focal Deficits] : no focal deficits [Oriented x3] : oriented x3 [Normal Affect] : normal affect

## 2023-06-26 ENCOUNTER — EMERGENCY (EMERGENCY)
Facility: HOSPITAL | Age: 28
LOS: 1 days | Discharge: HOME | End: 2023-06-26
Attending: EMERGENCY MEDICINE
Payer: MEDICAID

## 2023-06-26 VITALS
DIASTOLIC BLOOD PRESSURE: 87 MMHG | HEART RATE: 96 BPM | RESPIRATION RATE: 18 BRPM | TEMPERATURE: 98 F | OXYGEN SATURATION: 98 % | WEIGHT: 147.93 LBS | SYSTOLIC BLOOD PRESSURE: 117 MMHG

## 2023-06-26 VITALS
OXYGEN SATURATION: 98 % | DIASTOLIC BLOOD PRESSURE: 84 MMHG | SYSTOLIC BLOOD PRESSURE: 125 MMHG | HEART RATE: 85 BPM | RESPIRATION RATE: 18 BRPM

## 2023-06-26 DIAGNOSIS — Z90.81 ACQUIRED ABSENCE OF SPLEEN: Chronic | ICD-10-CM

## 2023-06-26 DIAGNOSIS — Z90.49 ACQUIRED ABSENCE OF OTHER SPECIFIED PARTS OF DIGESTIVE TRACT: Chronic | ICD-10-CM

## 2023-06-26 LAB
ALLERGY+IMMUNOLOGY DIAG STUDY NOTE: SIGNIFICANT CHANGE UP
ALLERGY+IMMUNOLOGY DIAG STUDY NOTE: SIGNIFICANT CHANGE UP
ANION GAP SERPL CALC-SCNC: 15 MMOL/L — SIGNIFICANT CHANGE UP (ref 5–17)
ANISOCYTOSIS BLD QL: SLIGHT — SIGNIFICANT CHANGE UP
BASOPHILS # BLD AUTO: 0 K/UL — SIGNIFICANT CHANGE UP (ref 0–0.2)
BASOPHILS NFR BLD AUTO: 0 % — SIGNIFICANT CHANGE UP (ref 0–2)
BLD GP AB SCN SERPL QL: SIGNIFICANT CHANGE UP
BUN SERPL-MCNC: 10.5 MG/DL — SIGNIFICANT CHANGE UP (ref 8–20)
CALCIUM SERPL-MCNC: 9.3 MG/DL — SIGNIFICANT CHANGE UP (ref 8.4–10.5)
CHLORIDE SERPL-SCNC: 103 MMOL/L — SIGNIFICANT CHANGE UP (ref 96–108)
CO2 SERPL-SCNC: 20 MMOL/L — LOW (ref 22–29)
CREAT SERPL-MCNC: 0.36 MG/DL — LOW (ref 0.5–1.3)
DAT IGG-SP REAG RBC-IMP: ABNORMAL
DIR ANTIGLOB POLYSPECIFIC INTERPRETATION: ABNORMAL
EGFR: 143 ML/MIN/1.73M2 — SIGNIFICANT CHANGE UP
EOSINOPHIL # BLD AUTO: 0.12 K/UL — SIGNIFICANT CHANGE UP (ref 0–0.5)
EOSINOPHIL NFR BLD AUTO: 0.9 % — SIGNIFICANT CHANGE UP (ref 0–6)
GIANT PLATELETS BLD QL SMEAR: PRESENT — SIGNIFICANT CHANGE UP
GLUCOSE SERPL-MCNC: 96 MG/DL — SIGNIFICANT CHANGE UP (ref 70–99)
HCT VFR BLD CALC: 25.1 % — LOW (ref 34.5–45)
HGB BLD-MCNC: 8.8 G/DL — LOW (ref 11.5–15.5)
IAT COMP-SP REAG SERPL QL: SIGNIFICANT CHANGE UP
LYMPHOCYTES # BLD AUTO: 25.9 % — SIGNIFICANT CHANGE UP (ref 13–44)
LYMPHOCYTES # BLD AUTO: 3.44 K/UL — HIGH (ref 1–3.3)
MACROCYTES BLD QL: SLIGHT — SIGNIFICANT CHANGE UP
MANUAL SMEAR VERIFICATION: SIGNIFICANT CHANGE UP
MCHC RBC-ENTMCNC: 31.8 PG — SIGNIFICANT CHANGE UP (ref 27–34)
MCHC RBC-ENTMCNC: 35.1 GM/DL — SIGNIFICANT CHANGE UP (ref 32–36)
MCV RBC AUTO: 90.6 FL — SIGNIFICANT CHANGE UP (ref 80–100)
MONOCYTES # BLD AUTO: 1.02 K/UL — HIGH (ref 0–0.9)
MONOCYTES NFR BLD AUTO: 7.7 % — SIGNIFICANT CHANGE UP (ref 2–14)
NEUTROPHILS # BLD AUTO: 8.7 K/UL — HIGH (ref 1.8–7.4)
NEUTROPHILS NFR BLD AUTO: 65.5 % — SIGNIFICANT CHANGE UP (ref 43–77)
NRBC # BLD: 3 /100 — HIGH (ref 0–0)
PLAT MORPH BLD: NORMAL — SIGNIFICANT CHANGE UP
PLATELET # BLD AUTO: 430 K/UL — HIGH (ref 150–400)
POIKILOCYTOSIS BLD QL AUTO: SLIGHT — SIGNIFICANT CHANGE UP
POLYCHROMASIA BLD QL SMEAR: SLIGHT — SIGNIFICANT CHANGE UP
POTASSIUM SERPL-MCNC: 3.7 MMOL/L — SIGNIFICANT CHANGE UP (ref 3.5–5.3)
POTASSIUM SERPL-SCNC: 3.7 MMOL/L — SIGNIFICANT CHANGE UP (ref 3.5–5.3)
RBC # BLD: 2.77 M/UL — LOW (ref 3.8–5.2)
RBC # BLD: 2.77 M/UL — LOW (ref 3.8–5.2)
RBC # FLD: 18.7 % — HIGH (ref 10.3–14.5)
RBC BLD AUTO: ABNORMAL
RETICS #: 300.8 K/UL — HIGH (ref 25–125)
RETICS/RBC NFR: 10.9 % — HIGH (ref 0.5–2.5)
SICKLE CELLS BLD QL SMEAR: SLIGHT — SIGNIFICANT CHANGE UP
SMUDGE CELLS # BLD: PRESENT — SIGNIFICANT CHANGE UP
SODIUM SERPL-SCNC: 138 MMOL/L — SIGNIFICANT CHANGE UP (ref 135–145)
TARGETS BLD QL SMEAR: SLIGHT — SIGNIFICANT CHANGE UP
WBC # BLD: 13.29 K/UL — HIGH (ref 3.8–10.5)
WBC # FLD AUTO: 13.29 K/UL — HIGH (ref 3.8–10.5)

## 2023-06-26 PROCEDURE — 99284 EMERGENCY DEPT VISIT MOD MDM: CPT | Mod: 25

## 2023-06-26 PROCEDURE — 86860 RBC ANTIBODY ELUTION: CPT

## 2023-06-26 PROCEDURE — 96375 TX/PRO/DX INJ NEW DRUG ADDON: CPT

## 2023-06-26 PROCEDURE — 86900 BLOOD TYPING SEROLOGIC ABO: CPT

## 2023-06-26 PROCEDURE — 96376 TX/PRO/DX INJ SAME DRUG ADON: CPT

## 2023-06-26 PROCEDURE — 99285 EMERGENCY DEPT VISIT HI MDM: CPT

## 2023-06-26 PROCEDURE — 85025 COMPLETE CBC W/AUTO DIFF WBC: CPT

## 2023-06-26 PROCEDURE — 36415 COLL VENOUS BLD VENIPUNCTURE: CPT

## 2023-06-26 PROCEDURE — 85045 AUTOMATED RETICULOCYTE COUNT: CPT

## 2023-06-26 PROCEDURE — 86870 RBC ANTIBODY IDENTIFICATION: CPT

## 2023-06-26 PROCEDURE — 96361 HYDRATE IV INFUSION ADD-ON: CPT

## 2023-06-26 PROCEDURE — 96374 THER/PROPH/DIAG INJ IV PUSH: CPT

## 2023-06-26 PROCEDURE — 86880 COOMBS TEST DIRECT: CPT

## 2023-06-26 PROCEDURE — 86077 PHYS BLOOD BANK SERV XMATCH: CPT

## 2023-06-26 PROCEDURE — 86901 BLOOD TYPING SEROLOGIC RH(D): CPT

## 2023-06-26 PROCEDURE — 86850 RBC ANTIBODY SCREEN: CPT

## 2023-06-26 PROCEDURE — 80048 BASIC METABOLIC PNL TOTAL CA: CPT

## 2023-06-26 RX ORDER — HYDROMORPHONE HYDROCHLORIDE 2 MG/ML
2 INJECTION INTRAMUSCULAR; INTRAVENOUS; SUBCUTANEOUS ONCE
Refills: 0 | Status: DISCONTINUED | OUTPATIENT
Start: 2023-06-26 | End: 2023-06-26

## 2023-06-26 RX ORDER — HYDROMORPHONE HYDROCHLORIDE 2 MG/ML
1 INJECTION INTRAMUSCULAR; INTRAVENOUS; SUBCUTANEOUS ONCE
Refills: 0 | Status: DISCONTINUED | OUTPATIENT
Start: 2023-06-26 | End: 2023-06-26

## 2023-06-26 RX ORDER — DIPHENHYDRAMINE HCL 50 MG
50 CAPSULE ORAL ONCE
Refills: 0 | Status: COMPLETED | OUTPATIENT
Start: 2023-06-26 | End: 2023-06-26

## 2023-06-26 RX ORDER — SODIUM CHLORIDE 9 MG/ML
2000 INJECTION INTRAMUSCULAR; INTRAVENOUS; SUBCUTANEOUS ONCE
Refills: 0 | Status: COMPLETED | OUTPATIENT
Start: 2023-06-26 | End: 2023-06-26

## 2023-06-26 RX ADMIN — HYDROMORPHONE HYDROCHLORIDE 1 MILLIGRAM(S): 2 INJECTION INTRAMUSCULAR; INTRAVENOUS; SUBCUTANEOUS at 06:01

## 2023-06-26 RX ADMIN — HYDROMORPHONE HYDROCHLORIDE 2 MILLIGRAM(S): 2 INJECTION INTRAMUSCULAR; INTRAVENOUS; SUBCUTANEOUS at 05:25

## 2023-06-26 RX ADMIN — HYDROMORPHONE HYDROCHLORIDE 2 MILLIGRAM(S): 2 INJECTION INTRAMUSCULAR; INTRAVENOUS; SUBCUTANEOUS at 03:57

## 2023-06-26 RX ADMIN — HYDROMORPHONE HYDROCHLORIDE 2 MILLIGRAM(S): 2 INJECTION INTRAMUSCULAR; INTRAVENOUS; SUBCUTANEOUS at 05:12

## 2023-06-26 RX ADMIN — Medication 50 MILLIGRAM(S): at 03:57

## 2023-06-26 RX ADMIN — HYDROMORPHONE HYDROCHLORIDE 2 MILLIGRAM(S): 2 INJECTION INTRAMUSCULAR; INTRAVENOUS; SUBCUTANEOUS at 04:10

## 2023-06-26 RX ADMIN — HYDROMORPHONE HYDROCHLORIDE 1 MILLIGRAM(S): 2 INJECTION INTRAMUSCULAR; INTRAVENOUS; SUBCUTANEOUS at 06:15

## 2023-06-26 RX ADMIN — SODIUM CHLORIDE 2000 MILLILITER(S): 9 INJECTION INTRAMUSCULAR; INTRAVENOUS; SUBCUTANEOUS at 03:57

## 2023-06-26 RX ADMIN — SODIUM CHLORIDE 2000 MILLILITER(S): 9 INJECTION INTRAMUSCULAR; INTRAVENOUS; SUBCUTANEOUS at 07:02

## 2023-06-26 NOTE — ED PROVIDER NOTE - CLINICAL SUMMARY MEDICAL DECISION MAKING FREE TEXT BOX
26 yo female PMHx SCA, port presents to ED c/o diffuse body pain, consistent with previous sickle cell crisis. No chest pain. Hemoglobin stable. Improved with medications. Has appt. with hematologist tomorrow. 28 yo female PMHx SCA, port presents to ED c/o diffuse body pain, consistent with previous sickle cell crisis. No chest pain. Hemoglobin stable. Improved with medications. Has appt. with hematologist tomorrow. Medically stable for discharge.

## 2023-06-26 NOTE — ED ADULT NURSE REASSESSMENT NOTE - NS ED NURSE REASSESS COMMENT FT1
Pt alert and oriented x 4, c/o of generalized pain, meds given and IVF infusing. Pt will be d/c after fluids are done.

## 2023-06-26 NOTE — ED ADULT NURSE NOTE - NSFALLUNIVINTERV_ED_ALL_ED
Bed/Stretcher in lowest position, wheels locked, appropriate side rails in place/Call bell, personal items and telephone in reach/Instruct patient to call for assistance before getting out of bed/chair/stretcher/Non-slip footwear applied when patient is off stretcher/[place] to call system/Physically safe environment - no spills, clutter or unnecessary equipment/Purposeful proactive rounding/Room/bathroom lighting operational, light cord in reach

## 2023-06-26 NOTE — ED ADULT NURSE NOTE - OBJECTIVE STATEMENT
Pt report having a sickle cell crisis currently have generalized pain took Dilaudid 8 mg and tylenol and reports no relief.

## 2023-06-26 NOTE — ED PROVIDER NOTE - NS ED ATTENDING STATEMENT MOD
This was a shared visit with the SALVATORE. I reviewed and verified the documentation and independently performed the documented:

## 2023-06-26 NOTE — ED ADULT NURSE REASSESSMENT NOTE - NSFALLUNIVINTERV_ED_ALL_ED
Bed/Stretcher in lowest position, wheels locked, appropriate side rails in place/Call bell, personal items and telephone in reach/Instruct patient to call for assistance before getting out of bed/chair/stretcher/Non-slip footwear applied when patient is off stretcher/[place] to call system/Physically safe environment - no spills, clutter or unnecessary equipment/Purposeful proactive rounding/Room/bathroom lighting operational, light cord in reach

## 2023-06-26 NOTE — ED PROVIDER NOTE - OBJECTIVE STATEMENT
26 yo female PMHx SCA, port presents to ED c/o sickle cell crisis. Reports diffuse body pain x1 day. Self medicated with acetaminophen and Dilaudid 8mg PO without relief. Reports since having baby 2 months has been intermittently having episodes and has been seen at OSH as lives in [place]. Has appt. Tuesday with hematologist. Sees biweekly for IVF. No further complaints at this time.   Denies chest pain. 26 yo female PMHx SCA, port presents to ED c/o sickle cell crisis. Reports diffuse body pain x1 day. Self medicated with acetaminophen and Dilaudid 8mg PO without relief. Reports since having baby 2 months has been intermittently having episodes and has been seen at OSH as lives in [place]. Several rounds of Dilaudid 2mg IV and Benadryl improved sxms. Has appt. Tuesday with hematologist. Sees biweekly for IVF. No further complaints at this time.   Denies chest pain.

## 2023-06-26 NOTE — ED PROVIDER NOTE - NSFOLLOWUPINSTRUCTIONS_ED_ALL_ED_FT
- Please keep scheduled appointment for tomorrow.   - Please bring all documentation from your ED visit to any related future follow up appointment.  - Please call to schedule follow up appointment with your primary care physician within 24-48 hours.  - Please seek immediate medical attention or return to the ED for any new/worsening, signs/symptoms, or concerns.    Feel better!     Sickle Cell Anemia, Adult       Sickle cell anemia is a condition in which red blood cells have an abnormal “sickle” shape. Red blood cells carry oxygen through the body. Sickle-shaped red blood cells do not live as long as normal red blood cells. They also clump together and block blood from flowing through the blood vessels. This condition prevents the body from getting enough oxygen. Sickle cell anemia causes organ damage and pain. It also increases the risk of infection.    What are the causes?  This condition is caused by a gene that is passed from parent to child (inherited). Receiving two copies of the gene causes the disease. Receiving one copy causes the "trait," which means that symptoms are milder or not present.    What increases the risk?  This condition is more likely to develop if your ancestors were from [place], the [place], [place] or [place], the [place], [place], or the [place].    What are the signs or symptoms?  Symptoms of this condition include:    Episodes of pain (crises), especially in the hands and feet, joints, back, chest, or abdomen. The pain can be triggered by:    An illness, especially if there is dehydration.  Doing an activity with great effort (overexertion).  Exposure to extreme temperature changes.  High altitude.  Fatigue.  Shortness of breath or difficulty breathing.  Dizziness.  Pale skin or yellowed skin (jaundice).  Frequent bacterial infections.  Pain and swelling in the hands and feet (hand-food syndrome).  Prolonged, painful erection of the penis (priapism).   Acute chest syndrome. Symptoms of this include:    Chest pain.  Fever.  Cough.  Fast breathing.  Stroke.  Decreased activity.  Loss of appetite.  Change in behavior.  Headaches.  Seizures.  Vision changes.  Skin ulcers.  Heart disease.  High blood pressure.  Gallstones.  Liver and kidney problems.    How is this diagnosed?  This condition is diagnosed with blood tests that check for the gene that causes this condition.    How is this treated?  There is no cure for most cases of this condition. Treatment focuses on managing your symptoms and preventing complications of the disease. Your health care provider will work with you to identify the best treatment options for you based on an assessment of your condition. Treatment may include:    Medicines, including:    Pain medicines.  Antibiotic medicines for infection.  Medicines to increase the production of a protein in red blood cells that helps carry oxygen in the body (hemoglobin).  Fluids to treat pain and swelling.  Oxygen to treat acute chest syndrome.  Blood transfusions to treat symptoms such as fatigue, stroke, and acute chest syndrome.  Massage and physical therapy for pain.  Regular tests to monitor your condition, such as blood tests, X-rays, CT scans, MRI scans, ultrasounds, and lung function tests. These should be done every 3–12 months, depending on your age.  Hematopoietic stem cell transplant. This is a procedure to replace abnormal stem cells with healthy stem cells from a donor's bone marrow. Stem cells are cells that can develop into blood cells, and bone marrow is the spongy tissue inside the bones.    Follow these instructions at home:      Medicines    Take over-the-counter and prescription medicines only as told by your health care provider.  If you were prescribed an antibiotic medicine, take it as told by your health care provider. Do not stop taking the antibiotic even if you start to feel better.  If you develop a fever, do not take medicines to reduce the fever right away. This could cover up another problem. Notify your health care provider.        Managing pain, stiffness, and swelling    Try these methods to help ease your pain:    Using a heating pad.  Taking a warm bath.  Distracting yourself, such as by watching TV.        Eating and drinking    Drink enough fluid to keep your urine clear or pale yellow. Drink more in hot weather and during exercise.  Limit or avoid drinking alcohol.  Eat a balanced and nutritious diet. Eat plenty of fruits, vegetables, whole grains, and lean protein.  Take vitamins and supplements as directed by your health care provider.        Traveling    When traveling, keep these with you:    Your medical information.  The names of your health care providers.  Your medicines.  If you have to travel by air, ask about precautions you should take.        Activity    Get plenty of rest.  Avoid activities that will lower your oxygen levels, such as exercising vigorously.        General instructions    Do not use any products that contain nicotine or tobacco, such as cigarettes and e-cigarettes. They lower blood oxygen levels. If you need help quitting, ask your health care provider.  Consider wearing a medical alert bracelet.  Avoid high altitudes.  Avoid extreme temperatures and extreme temperature changes.  Keep all follow-up visits as told by your health care provider. This is important.    Contact a health care provider if:  You develop joint pain.  Your feet or hands swell or have pain.  You have fatigue.    Get help right away if:  You have symptoms of infection. These include:    Fever.  Chills.  Extreme tiredness.  Irritability.  Poor eating.  Vomiting.  You feel dizzy or faint.  You have new abdominal pain, especially on the left side near the stomach area.  You develop priapism.  You have numbness in your arms or legs or have trouble moving them.  You have trouble talking.  You develop pain that cannot be controlled with medicine.  You become short of breath.  You have rapid breathing.  You have a persistent cough.  You have pain in your chest.  You develop a severe headache or stiff neck.  You feel bloated without eating or after eating a small amount of food.  Your skin is pale.  You suddenly lose vision.    Summary  Sickle cell anemia is a condition in which red blood cells have an abnormal “sickle” shape. This disease can cause organ damage and chronic pain, and it can raise your risk of infection.  Sickle cell anemia is a genetic disorder.  Treatment focuses on managing your symptoms and preventing complications of the disease.  Get medical help right away if you have any signs of infection, such as a fever.    ADDITIONAL NOTES AND INSTRUCTIONS    Please follow up with your Primary MD in 24-48 hr.  Seek immediate medical care for any new/worsening signs or symptoms.

## 2023-06-26 NOTE — ED PROVIDER NOTE - PATIENT PORTAL LINK FT
You can access the FollowMyHealth Patient Portal offered by [facility] by registering at the following website: [email or web address]. By joining [facility]’s FollowMyHealth portal, you will also be able to view your health information using other applications (apps) compatible with our system.

## 2023-10-24 NOTE — ED ADULT NURSE NOTE - NS PRO PASSIVE SMOKE EXP
Unknown Winlevi Counseling:  I discussed with the patient the risks of topical clascoterone including but not limited to erythema, scaling, itching, and stinging. Patient voiced their understanding.

## 2024-02-22 NOTE — ED PROVIDER NOTE - CPE EDP RESP NORM
----- Message from Dexter Ledbetter MA sent at 2/20/2024  4:56 PM CST -----  Regarding: RE: MRI 1/20 review  Hello,     Can you give her a phone call to review her MRI from 1/20?   Please advise.     Khanh Dennis   ----- Message -----  From: Shwetha Rush PA-C  Sent: 2/20/2024   4:55 PM CST  To: Dexter Ledbetter MA  Subject: RE: MRI 1/20 review                              I do not see a message attached    Shwetha Rush, SUKH, KAREL  Neurosurgery  [facility]  02/20/2024    ----- Message -----  From: Dexter Ledbetter MA  Sent: 2/20/2024   3:49 PM CST  To: Shwetha Rush PA-C  Subject: MRI 1/20 review                                  Good Afternoon Mrs. Tadeo,     Please Advise.     Thanks,   Khanh         
Bilateral L4 TF SAMMY ordered with Dr. Franklin.    Fu with me in 2 months    Shwetha Rush, [facility], PA-C  Neurosurgery  [facility]  02/21/2024    
Please see phone note from 01/24/24 and tell her I reviewed it then and tell her what I wrote.  Let me know is she wants to try the injections.    This is the second time I have sent this message.     Thanks,  Shwetha Rush [facility], PA-C  Neurosurgery  [facility]  02/01/2024  
normal...

## 2024-03-12 DIAGNOSIS — Z00.00 ENCOUNTER FOR GENERAL ADULT MEDICAL EXAMINATION W/OUT ABNORMAL FINDINGS: ICD-10-CM

## 2024-03-14 ENCOUNTER — APPOINTMENT (OUTPATIENT)
Dept: HEMATOLOGY ONCOLOGY | Facility: CLINIC | Age: 29
End: 2024-03-14
Payer: MEDICARE

## 2024-03-14 DIAGNOSIS — M87.022 IDIOPATHIC ASEPTIC NECROSIS OF RIGHT HUMERUS: ICD-10-CM

## 2024-03-14 DIAGNOSIS — D57.1 SICKLE-CELL DISEASE W/OUT CRISIS: ICD-10-CM

## 2024-03-14 DIAGNOSIS — O26.90 PREGNANCY RELATED CONDITIONS, UNSPECIFIED, UNSPECIFIED TRIMESTER: ICD-10-CM

## 2024-03-14 DIAGNOSIS — Z83.2 FAMILY HISTORY OF DISEASES OF THE BLOOD AND BLOOD-FORMING ORGANS AND CERTAIN DISORDERS INVOLVING THE IMMUNE MECHANISM: ICD-10-CM

## 2024-03-14 DIAGNOSIS — Z78.9 OTHER SPECIFIED HEALTH STATUS: ICD-10-CM

## 2024-03-14 DIAGNOSIS — I82.622 ACUTE EMBOLISM AND THROMBOSIS OF DEEP VEINS OF LEFT UPPER EXTREMITY: ICD-10-CM

## 2024-03-14 DIAGNOSIS — H36.89 SICKLE-CELL DISEASE W/OUT CRISIS: ICD-10-CM

## 2024-03-14 DIAGNOSIS — M87.021 IDIOPATHIC ASEPTIC NECROSIS OF RIGHT HUMERUS: ICD-10-CM

## 2024-03-14 DIAGNOSIS — D57.01 HB-SS DISEASE WITH ACUTE CHEST SYNDROME: ICD-10-CM

## 2024-03-14 PROCEDURE — 99204 OFFICE O/P NEW MOD 45 MIN: CPT

## 2024-03-20 PROBLEM — D57.1 SICKLE CELL DISEASE: Status: ACTIVE | Noted: 2024-03-20

## 2024-03-20 PROBLEM — D57.1 SICKLE CELL RETINOPATHY: Status: RESOLVED | Noted: 2024-03-20 | Resolved: 2024-03-20

## 2024-03-20 PROBLEM — M87.021: Status: RESOLVED | Noted: 2024-03-20 | Resolved: 2024-03-20

## 2024-03-20 PROBLEM — Z83.2 FAMILY HISTORY OF SICKLE CELL TRAIT: Status: ACTIVE | Noted: 2024-03-20

## 2024-03-20 PROBLEM — O26.90 PREGNANCY, COMPLICATED: Status: RESOLVED | Noted: 2024-03-20 | Resolved: 2024-03-20

## 2024-03-20 PROBLEM — D57.01 SICKLE CELL CRISIS ACUTE CHEST SYNDROME: Status: RESOLVED | Noted: 2024-03-20 | Resolved: 2024-03-20

## 2024-03-20 PROBLEM — Z78.9 NEVER SMOKED ANY SUBSTANCE: Status: ACTIVE | Noted: 2024-03-20

## 2024-03-20 PROBLEM — I82.622 ACUTE DEEP VEIN THROMBOSIS (DVT) OF LEFT UPPER EXTREMITY, UNSPECIFIED VEIN: Status: RESOLVED | Noted: 2024-03-20 | Resolved: 2024-03-20

## 2024-03-20 RX ORDER — MULTIVITAMIN
CAPSULE ORAL
Refills: 0 | Status: ACTIVE | COMMUNITY

## 2024-03-20 RX ORDER — IBUPROFEN 600 MG/1
600 TABLET, FILM COATED ORAL
Refills: 0 | Status: ACTIVE | COMMUNITY

## 2024-03-20 RX ORDER — ACETAMINOPHEN 500 MG/1
TABLET ORAL
Refills: 0 | Status: ACTIVE | COMMUNITY

## 2024-03-20 RX ORDER — BUPRENORPHINE HYDROCHLORIDE 2 MG/1
2 TABLET SUBLINGUAL
Refills: 0 | Status: ACTIVE | COMMUNITY

## 2024-03-20 RX ORDER — APIXABAN 5 MG/1
5 TABLET, FILM COATED ORAL
Refills: 0 | Status: ACTIVE | COMMUNITY

## 2024-03-20 NOTE — HISTORY OF PRESENT ILLNESS
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 Jha Patient transfused in the following hospitals: [facility], [facility] History of reactions? 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 Denies CVA, Leg ulcers, Diabetes, thrombosis, cholecystectomy, myocardial infarction, kidney disease, liver disease.  Pregnancy in 2023 complicated by prolonged hospital admissions during the entire pregnancy Last optho appointment 2021 03/14/2024 Patient complains of pain. Was in the hospital during the appointment. Had recent transfusion. Saw providers at [place] after my departure

## 2024-03-20 NOTE — ASSESSMENT
[FreeTextEntry1] : 28 year F  with Hgb SSdisease   #Sickle cell disease discussed disease modifying medications including hydroxyurea, l-glutamine, voxelotor, crizanlizumab Patient will ultimately need gene therapy or curative treatment well known to me, unsure of outpatient compliance will restart hydroxyurea and adakveo if patient returns Patient is  Candidate for transplant/gene therapy     #Pain given pain contract on next [place] visit would do buprenorphine, hydromorphone, and methadone pain plan for ED     #HCM Last optho appointment-overdue--->needs intervention Last pneumonia vaccine Last urine protein/microalbumin Has PCP/OB/gyn-no after inperson visit will Will refer to retina specialist: [facility] [place] instead. The number is [phone] located on [address] [place] [address], [address], [place] [address]   Evon Parker MD [address] [place] [address] [address], [place], [place] [id number] [phone]     Tung Vivas MD Ophthalmology [phone] [address] [place] [address], [address] Floor, [place] [address]      #Next appointment ** weeks with labs/port   #Labs at next appointment CBC, retic, iron, ferritin, urinalysis, urine toxicology, hgb electrophoresis, CMP, LDH

## 2024-03-20 NOTE — REASON FOR VISIT
[Other Location: e.g. School (Enter Location, City,State)___] : at [unfilled], at the time of the visit. [Medical Office: ([facility])___] : at the medical office located in

## 2024-05-13 ENCOUNTER — APPOINTMENT (OUTPATIENT)
Dept: HEMATOLOGY ONCOLOGY | Facility: CLINIC | Age: 29
End: 2024-05-13

## 2024-05-29 ENCOUNTER — NON-APPOINTMENT (OUTPATIENT)
Age: 29
End: 2024-05-29

## 2024-05-29 ENCOUNTER — APPOINTMENT (OUTPATIENT)
Dept: HEMATOLOGY ONCOLOGY | Facility: CLINIC | Age: 29
End: 2024-05-29

## 2024-07-01 ENCOUNTER — RESULT REVIEW (OUTPATIENT)
Age: 29
End: 2024-07-01

## 2024-07-01 ENCOUNTER — APPOINTMENT (OUTPATIENT)
Dept: HEMATOLOGY ONCOLOGY | Facility: CLINIC | Age: 29
End: 2024-07-01
Payer: MEDICARE

## 2024-07-01 VITALS
TEMPERATURE: 98.1 F | HEART RATE: 111 BPM | OXYGEN SATURATION: 100 % | SYSTOLIC BLOOD PRESSURE: 124 MMHG | HEIGHT: 65 IN | BODY MASS INDEX: 23.66 KG/M2 | DIASTOLIC BLOOD PRESSURE: 82 MMHG | RESPIRATION RATE: 16 BRPM | WEIGHT: 142 LBS

## 2024-07-01 DIAGNOSIS — D57.1 SICKLE-CELL DISEASE W/OUT CRISIS: ICD-10-CM

## 2024-07-01 PROCEDURE — 99214 OFFICE O/P EST MOD 30 MIN: CPT

## 2024-07-01 RX ORDER — CYCLOBENZAPRINE HYDROCHLORIDE 5 MG/1
5 TABLET, FILM COATED ORAL EVERY 8 HOURS
Qty: 30 | Refills: 0 | Status: ACTIVE | COMMUNITY
Start: 2024-07-01 | End: 1900-01-01

## 2024-07-01 NOTE — HISTORY OF PRESENT ILLNESS
[de-identified] :  28 year F Non- or  with Hgb*SSdisease   Patient previously known from [place]   Patient born in hospital [facility] Patient seen in pediatrics B Patient seen as an adult by [facility] Patient transfused in the following hospitals: Transfused at [facility], [facility], [facility], [facility] 5 exchange transfusion History of reactions-hives Patient has history of CVA, Leg ulcers, Diabetes, , cholecystectomy, AVN wo surgery with both shoulders, retinopathy, myocardial infarction, kidney disease, liver disease. Iron overload, not treated Acute chest syndrome w/o intubation DVT PE on eliquis Cholecystectomy and Splenectomy   First pregnancy-crisis, transfusions,  Last optho appointment   03/18/2024 Stopped adakveo with me. Now on hydroxyurea 3 pills. Saw. Dr. Peterson at [facility]. Had hydration twie.  Patient complains of. Taking. Recent in the ED/hospital 3 days ago. NJTransfused 3 days? Infections?

## 2024-07-03 ENCOUNTER — NON-APPOINTMENT (OUTPATIENT)
Age: 29
End: 2024-07-03

## 2024-07-03 RX ORDER — CHOLECALCIFEROL (VITAMIN D3) 25 MCG
25 MCG TABLET ORAL DAILY
Qty: 30 | Refills: 5 | Status: ACTIVE | COMMUNITY
Start: 2024-07-03 | End: 1900-01-01

## 2024-07-14 ENCOUNTER — TRANSCRIPTION ENCOUNTER (OUTPATIENT)
Age: 29
End: 2024-07-14

## 2024-07-15 ENCOUNTER — RESULT REVIEW (OUTPATIENT)
Age: 29
End: 2024-07-15

## 2024-07-16 ENCOUNTER — TRANSCRIPTION ENCOUNTER (OUTPATIENT)
Age: 29
End: 2024-07-16

## 2024-07-31 ENCOUNTER — RESULT REVIEW (OUTPATIENT)
Age: 29
End: 2024-07-31

## 2024-07-31 ENCOUNTER — APPOINTMENT (OUTPATIENT)
Dept: HEMATOLOGY ONCOLOGY | Facility: CLINIC | Age: 29
End: 2024-07-31
Payer: MEDICARE

## 2024-07-31 VITALS
HEIGHT: 65 IN | BODY MASS INDEX: 23.01 KG/M2 | RESPIRATION RATE: 16 BRPM | OXYGEN SATURATION: 99 % | HEART RATE: 82 BPM | TEMPERATURE: 97.7 F | WEIGHT: 138.13 LBS | SYSTOLIC BLOOD PRESSURE: 103 MMHG | DIASTOLIC BLOOD PRESSURE: 67 MMHG

## 2024-07-31 DIAGNOSIS — T78.40XA ALLERGY, UNSPECIFIED, INITIAL ENCOUNTER: ICD-10-CM

## 2024-07-31 DIAGNOSIS — D57.1 SICKLE-CELL DISEASE W/OUT CRISIS: ICD-10-CM

## 2024-07-31 DIAGNOSIS — E55.9 VITAMIN D DEFICIENCY, UNSPECIFIED: ICD-10-CM

## 2024-07-31 PROCEDURE — 99214 OFFICE O/P EST MOD 30 MIN: CPT

## 2024-07-31 RX ORDER — HYDROCORTISONE 5 MG/1
5 TABLET ORAL
Qty: 5 | Refills: 1 | Status: ACTIVE | COMMUNITY
Start: 2024-07-31 | End: 1900-01-01

## 2024-08-02 ENCOUNTER — TRANSCRIPTION ENCOUNTER (OUTPATIENT)
Age: 29
End: 2024-08-02

## 2024-08-02 PROBLEM — E55.9 VITAMIN D DEFICIENCY: Status: ACTIVE | Noted: 2024-08-02

## 2024-08-02 RX ORDER — ERGOCALCIFEROL 1.25 MG/1
1.25 MG CAPSULE, LIQUID FILLED ORAL
Qty: 10 | Refills: 0 | Status: ACTIVE | COMMUNITY
Start: 2024-08-02 | End: 1900-01-01

## 2024-08-02 NOTE — HISTORY OF PRESENT ILLNESS
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 Jha Patient transfused in the following hospitals: [facility], [facility] History of transfusion reactions: Nonhemolytic transfusion reactions, pruritus, treated with IV benadryl Patient has history of acute chest syndrome wo intubation, thrombosis, retinopathy, Avascular necrosis in bilateral humerus. Denies CVA, Leg ulcers, Diabetes, thrombosis, cholecystectomy, myocardial infarction, kidney disease, liver disease.  Pregnancy in 2023 complicated by prolonged hospital admissions during the entire pregnancy Last optho appointment 2021, has sickle retinopathy, patient never returned to ophthalmologist    03/14/2024 Patient complains of pain. Was in the hospital during the appointment. Had recent transfusion. Saw providers at [place] after my departure  [de-identified] : Was in [place] in had a crisis beginning of July.  currently having another crisis  A.      When did they pain start? a week B.      Where is the pain? BL legs C.      What is their pain score on a scale of 1-10? 8 D.      How would they describe the pain? throbbing,achey E.       Have they had any fever, cough, vomiting? If fever, how high was it? no F.       If they have abdominal pain, when was their last bowel movement? no, yesterday G.      What have they taken so far for the pain? The name of the medications, how often have they taken it in the last 24 hours? dialudid 8mg  7/1/2024 first period last week Patient here for follow-up for sickle cell. Currently having crisis. Lower back pain ; ongoing for past couple of days; 8/10; achy and stabbing; No fever, cough, vomiting; taking hydromorphone 8mg has medication but stated she is due for a refill  7/31/2024 Currently in pain having crisis did not receive oral pain medication Patient had transfusion last week. will need to reschedule [place] appointment will see eye doctor next week

## 2024-08-02 NOTE — HISTORY OF PRESENT ILLNESS
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 Jha Patient transfused in the following hospitals: [facility], [facility] History of transfusion reactions: Nonhemolytic transfusion reactions, pruritus, treated with IV benadryl Patient has history of acute chest syndrome wo intubation, thrombosis, retinopathy, Avascular necrosis in bilateral humerus. Denies CVA, Leg ulcers, Diabetes, thrombosis, cholecystectomy, myocardial infarction, kidney disease, liver disease.  Pregnancy in 2023 complicated by prolonged hospital admissions during the entire pregnancy Last optho appointment 2021, has sickle retinopathy, patient never returned to ophthalmologist    03/14/2024 Patient complains of pain. Was in the hospital during the appointment. Had recent transfusion. Saw providers at [place] after my departure  [de-identified] : Was in [place] in had a crisis beginning of July.  currently having another crisis  A.      When did they pain start? a week B.      Where is the pain? BL legs C.      What is their pain score on a scale of 1-10? 8 D.      How would they describe the pain? throbbing,achey E.       Have they had any fever, cough, vomiting? If fever, how high was it? no F.       If they have abdominal pain, when was their last bowel movement? no, yesterday G.      What have they taken so far for the pain? The name of the medications, how often have they taken it in the last 24 hours? dialudid 8mg  7/1/2024 first period last week Patient here for follow-up for sickle cell. Currently having crisis. Lower back pain ; ongoing for past couple of days; 8/10; achy and stabbing; No fever, cough, vomiting; taking hydromorphone 8mg has medication but stated she is due for a refill  7/31/2024 Currently in pain having crisis did not receive oral pain medication Patient had transfusion last week. will need to reschedule [place] appointment will see eye doctor next week

## 2024-08-02 NOTE — HISTORY OF PRESENT ILLNESS
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 Jha Patient transfused in the following hospitals: [facility], [facility] History of transfusion reactions: Nonhemolytic transfusion reactions, pruritus, treated with IV benadryl Patient has history of acute chest syndrome wo intubation, thrombosis, retinopathy, Avascular necrosis in bilateral humerus. Denies CVA, Leg ulcers, Diabetes, thrombosis, cholecystectomy, myocardial infarction, kidney disease, liver disease.  Pregnancy in 2023 complicated by prolonged hospital admissions during the entire pregnancy Last optho appointment 2021, has sickle retinopathy, patient never returned to ophthalmologist    03/14/2024 Patient complains of pain. Was in the hospital during the appointment. Had recent transfusion. Saw providers at [place] after my departure  [de-identified] : Was in [place] in had a crisis beginning of July.  currently having another crisis  A.      When did they pain start? a week B.      Where is the pain? BL legs C.      What is their pain score on a scale of 1-10? 8 D.      How would they describe the pain? throbbing,achey E.       Have they had any fever, cough, vomiting? If fever, how high was it? no F.       If they have abdominal pain, when was their last bowel movement? no, yesterday G.      What have they taken so far for the pain? The name of the medications, how often have they taken it in the last 24 hours? dialudid 8mg  7/1/2024 first period last week Patient here for follow-up for sickle cell. Currently having crisis. Lower back pain ; ongoing for past couple of days; 8/10; achy and stabbing; No fever, cough, vomiting; taking hydromorphone 8mg has medication but stated she is due for a refill  7/31/2024 Currently in pain having crisis did not receive oral pain medication Patient had transfusion last week. will need to reschedule [place] appointment will see eye doctor next week

## 2024-08-02 NOTE — ASSESSMENT
[FreeTextEntry1] : 28 year F  with Hgb SS disease  #Rash on hand and mouth -possibly related to transfusion -will give steroid cream   #Sickle cell disease discussed disease modifying medications including hydroxyurea, l-glutamine, voxelotor, crizanlizumab Patient will ultimately need gene therapy or curative treatment well known to me, unsure of outpatient compliance Patient is  Candidate for transplant/gene therapy   --->increase hydroxyurea 2000mg, increase methadone 10mg twice a day start cyclobenzaprine -treatment in 2 weeks see me in 1 month  #Pain given pain contract  would do  hydromorphone, and methadone -will switch to [facility] pain plan for ED  hydormophone 4mg, 1/2 normal saline   #HCM Last optho appointment-overdue--->needs intervention Last pneumonia vaccine Last urine protein/microalbumin Has PCP/OB/gyn-no will go to [place]       #Next appointment ** weeks with labs/port   #Labs at next appointment CBC, retic, iron, ferritin, urinalysis, urine toxicology, hgb electrophoresis, CMP, LDH

## 2024-08-02 NOTE — ASSESSMENT
[FreeTextEntry1] : 28 year F  with Hgb SS disease  #Rash on hand and mouth -possibly related to transfusion -will give steroid cream   #Sickle cell disease discussed disease modifying medications including hydroxyurea, l-glutamine, voxelotor, crizanlizumab Patient will ultimately need gene therapy or curative treatment well known to me, unsure of outpatient compliance Patient is  Candidate for transplant/gene therapy   --->increase hydroxyurea 2000mg, increase methadone 10mg twice a day start cyclobenzaprine -treatment in 2 weeks see me in 1 month  #Pain given pain contract  would do  hydromorphone, and methadone -will switch to [facility] pain plan for ED  hydormophone 4mg, 1/2 normal saline   #HCM Last optho appointment-overdue--->needs intervention Last pneumonia vaccine Last urine protein/microalbumin Has PCP/OB/gyn-no will go to [place]       #Next appointment ** weeks with labs/port   #Labs at next appointment CBC, retic, iron, ferritin, urinalysis, urine toxicology, hgb electrophoresis, CMP, LDH

## 2024-08-02 NOTE — ASSESSMENT
[FreeTextEntry1] : 28 year F  with Hgb SS disease  #Rash on hand and mouth -possibly related to transfusion -will give steroid cream   #Sickle cell disease discussed disease modifying medications including hydroxyurea, l-glutamine, voxelotor, crizanlizumab Patient will ultimately need gene therapy or curative treatment well known to me, unsure of outpatient compliance Patient is  Candidate for transplant/gene therapy   --->increase hydroxyurea 2000mg, increase methadone 10mg twice a day start cyclobenzaprine -treatment in 2 weeks see me in 1 month  #Pain given pain contract  would do  hydromorphone, and methadone -will switch to [facility] pain plan for ED  hydormophone 4mg, 1/2 normal saline   #HCM Last optho appointment-overdue--->needs intervention Last pneumonia vaccine Last urine protein/microalbumin Has PCP/OB/gyn-no will go to [place]       #Next appointment ** weeks with labs/port   #Labs at next appointment CBC, retic, iron, ferritin, urinalysis, urine toxicology, hgb electrophoresis, CMP, LDH

## 2024-08-06 ENCOUNTER — NON-APPOINTMENT (OUTPATIENT)
Age: 29
End: 2024-08-06

## 2024-08-26 ENCOUNTER — RESULT REVIEW (OUTPATIENT)
Age: 29
End: 2024-08-26

## 2024-08-26 ENCOUNTER — APPOINTMENT (OUTPATIENT)
Dept: HEMATOLOGY ONCOLOGY | Facility: CLINIC | Age: 29
End: 2024-08-26
Payer: MEDICARE

## 2024-08-26 VITALS
BODY MASS INDEX: 24.37 KG/M2 | WEIGHT: 146.25 LBS | DIASTOLIC BLOOD PRESSURE: 66 MMHG | HEIGHT: 65 IN | HEART RATE: 103 BPM | SYSTOLIC BLOOD PRESSURE: 123 MMHG | OXYGEN SATURATION: 100 % | RESPIRATION RATE: 16 BRPM | TEMPERATURE: 96.5 F

## 2024-08-26 DIAGNOSIS — D57.1 SICKLE-CELL DISEASE W/OUT CRISIS: ICD-10-CM

## 2024-08-26 DIAGNOSIS — N91.5 OLIGOMENORRHEA, UNSPECIFIED: ICD-10-CM

## 2024-08-26 PROCEDURE — 99214 OFFICE O/P EST MOD 30 MIN: CPT

## 2024-08-26 NOTE — ASSESSMENT
[FreeTextEntry1] : 28 year F  with Hgb SS disease  #Elevated Alk phos and LFTS -may be related to sicklings IV fluids today also tachycardia  #oligomenorrhea -interested in future pregnancies, will refer to reproductive endocrinology to evaluate ovarian reserve   #Sickle cell disease discussed disease modifying medications including hydroxyurea, l-glutamine, voxelotor, crizanlizumab Patient will ultimately need gene therapy or curative treatment well known to me, unsure of outpatient compliance Patient is  Candidate for transplant/gene therapy c/w cyclobenzaprine -treatment in 2 weeks see me in 1 month  #Pain given pain contract  would do  hydromorphone, and methadone -will switch to [facility] pain plan for ED  hydormophone 4mg, 1/2 normal saline   #HCM Last optho appointment-overdue--->needs intervention Last pneumonia vaccine Last urine protein/microalbumin Has PCP/OB/gyn-no will go to [place]       #Next appointment 4weeks with labs/port   #Labs at next appointment CBC, retic, iron, ferritin, urinalysis, urine toxicology, hgb electrophoresis, CMP, LDH

## 2024-08-26 NOTE — HISTORY OF PRESENT ILLNESS
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 Jha Patient transfused in the following hospitals: [facility], [facility] History of transfusion reactions: Nonhemolytic transfusion reactions, pruritus, treated with IV benadryl Patient has history of acute chest syndrome wo intubation, thrombosis, retinopathy, Avascular necrosis in bilateral humerus. Denies CVA, Leg ulcers, Diabetes, thrombosis, cholecystectomy, myocardial infarction, kidney disease, liver disease.  Pregnancy in 2023 complicated by prolonged hospital admissions during the entire pregnancy Last optho appointment 2021, has sickle retinopathy, patient never returned to ophthalmologist    03/14/2024 Patient complains of pain. Was in the hospital during the appointment. Had recent transfusion. Saw providers at [facility] after my departure  [de-identified] : Was in [place] in had a crisis beginning of July.  currently having another crisis  A.      When did they pain start? a week B.      Where is the pain? BL legs C.      What is their pain score on a scale of 1-10? 8 D.      How would they describe the pain? throbbing,achey E.       Have they had any fever, cough, vomiting? If fever, how high was it? no F.       If they have abdominal pain, when was their last bowel movement? no, yesterday G.      What have they taken so far for the pain? The name of the medications, how often have they taken it in the last 24 hours? dialudid 8mg  7/1/2024 first period last week Patient here for follow-up for sickle cell. Currently having crisis. Lower back pain ; ongoing for past couple of days; 8/10; achy and stabbing; No fever, cough, vomiting; taking hydromorphone 8mg has medication but stated she is due for a refill  7/31/2024 Currently in pain having crisis did not receive oral pain medication Patient had transfusion last week. will need to reschedule [place] appointment will see eye doctor next week   8/26/2024 Patient had chest pain, was hospitalized at another hospital in [place], for 1 week. Had CT scan without PE or pneumonia per patient Patient is on eliquis Feels short of breath with exertion Has diaphoresis present with pregnancy, resolved, then returned Taking hydroxyurea 2000mg cyclobenzaprine

## 2024-08-26 NOTE — HISTORY OF PRESENT ILLNESS
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 Jha Patient transfused in the following hospitals: [facility], [facility] History of transfusion reactions: Nonhemolytic transfusion reactions, pruritus, treated with IV benadryl Patient has history of acute chest syndrome wo intubation, thrombosis, retinopathy, Avascular necrosis in bilateral humerus. Denies CVA, Leg ulcers, Diabetes, thrombosis, cholecystectomy, myocardial infarction, kidney disease, liver disease.  Pregnancy in 2023 complicated by prolonged hospital admissions during the entire pregnancy Last optho appointment 2021, has sickle retinopathy, patient never returned to ophthalmologist    03/14/2024 Patient complains of pain. Was in the hospital during the appointment. Had recent transfusion. Saw providers at [place] after my departure  [de-identified] : Was in [place] in had a crisis beginning of July.  currently having another crisis  A.      When did they pain start? a week B.      Where is the pain? BL legs C.      What is their pain score on a scale of 1-10? 8 D.      How would they describe the pain? throbbing,achey E.       Have they had any fever, cough, vomiting? If fever, how high was it? no F.       If they have abdominal pain, when was their last bowel movement? no, yesterday G.      What have they taken so far for the pain? The name of the medications, how often have they taken it in the last 24 hours? dialudid 8mg  7/1/2024 first period last week Patient here for follow-up for sickle cell. Currently having crisis. Lower back pain ; ongoing for past couple of days; 8/10; achy and stabbing; No fever, cough, vomiting; taking hydromorphone 8mg has medication but stated she is due for a refill  7/31/2024 Currently in pain having crisis did not receive oral pain medication Patient had transfusion last week. will need to reschedule [place] appointment will see eye doctor next week   8/26/2024 Patient had chest pain, was hospitalized at another hospital in [place], for 1 week. Had CT scan without PE or pneumonia per patient Patient is on eliquis Feels short of breath with exertion Has diaphoresis present with pregnancy, resolved, then returned Taking hydroxyurea 2000mg cyclobenzaprine

## 2024-08-26 NOTE — ASSESSMENT
[FreeTextEntry1] : 28 year F  with Hgb SS disease  #Elevated Alk phos and LFTS -may be related to sicklings IV fluids today also tachycardia  #oligomenorrhea -interested in future pregnancies, will refer to reproductive endocrinology to evaluate ovarian reserve   #Sickle cell disease discussed disease modifying medications including hydroxyurea, l-glutamine, voxelotor, crizanlizumab Patient will ultimately need gene therapy or curative treatment well known to me, unsure of outpatient compliance Patient is  Candidate for transplant/gene therapy c/w cyclobenzaprine -treatment in 2 weeks see me in 1 month  #Pain given pain contract  would do  hydromorphone, and methadone -will switch to [facility] pain plan for ED  hydormophone 4mg, 1/2 normal saline   #HCM Last optho appointment-overdue--->needs intervention Last pneumonia vaccine Last urine protein/microalbumin Has PCP/OB/gyn-no will go to [place]       #Next appointment 4weeks with labs/port   #Labs at next appointment CBC, retic, iron, ferritin, urinalysis, urine toxicology, hgb electrophoresis, CMP, LDH

## 2024-08-28 ENCOUNTER — NON-APPOINTMENT (OUTPATIENT)
Age: 29
End: 2024-08-28

## 2024-09-05 ENCOUNTER — TRANSCRIPTION ENCOUNTER (OUTPATIENT)
Age: 29
End: 2024-09-05

## 2024-09-23 ENCOUNTER — RESULT REVIEW (OUTPATIENT)
Age: 29
End: 2024-09-23

## 2024-09-23 ENCOUNTER — NON-APPOINTMENT (OUTPATIENT)
Age: 29
End: 2024-09-23

## 2024-09-23 ENCOUNTER — APPOINTMENT (OUTPATIENT)
Dept: HEMATOLOGY ONCOLOGY | Facility: CLINIC | Age: 29
End: 2024-09-23
Payer: MEDICARE

## 2024-09-23 VITALS
HEART RATE: 76 BPM | WEIGHT: 148.5 LBS | HEIGHT: 65 IN | TEMPERATURE: 97.9 F | OXYGEN SATURATION: 100 % | DIASTOLIC BLOOD PRESSURE: 86 MMHG | RESPIRATION RATE: 16 BRPM | BODY MASS INDEX: 24.74 KG/M2 | SYSTOLIC BLOOD PRESSURE: 128 MMHG

## 2024-09-23 DIAGNOSIS — D57.1 SICKLE-CELL DISEASE W/OUT CRISIS: ICD-10-CM

## 2024-09-23 PROCEDURE — 99214 OFFICE O/P EST MOD 30 MIN: CPT

## 2024-09-25 ENCOUNTER — RESULT REVIEW (OUTPATIENT)
Age: 29
End: 2024-09-25

## 2024-09-27 NOTE — ASSESSMENT
[FreeTextEntry1] : 28 year F  with Hgb SS disease  #recent RSV vaccine and positive mediport blood cultures -will do vaccine next visit will do Adakveo next visit -will repeat cultures today, if positive will bring patient back for IV antibiotics -may need to change port or place in different area   #Elevated Alk phos and LFTS -may be related to sickling IV fluids today also tachycardia  #oligomenorrhea -interested in future pregnancies, will refer to reproductive endocrinology to evaluate ovarian reserve   #Sickle cell disease discussed disease modifying medications including hydroxyurea, l-glutamine, voxelotor, crizanlizumab Patient will ultimately need gene therapy or curative treatment well known to me, unsure of outpatient compliance Patient is  Candidate for transplant/gene therapy c/w cyclobenzaprine -treatment in 2 weeks see me in 1 month  #Pain concerned about patient dependence on IV opiates, patient is in the ED or our infusion center, explained to patient that she needs to see transplant. will d/w staff about scheduling and will try to limit and manage infusion center appointments more closely given pain contract  would do  hydromorphone, and methadone -will switch to [facility] pharmacy pain plan for ED  hydormophone 4mg, 1/2 normal saline   #HCM Last optho appointment-overdue--->needs intervention Last pneumonia vaccine Last urine protein/microalbumin Has PCP/OB/gyn-no will go to [place]       #Next appointment 2weeks with labs/port   #Labs at next appointment CBC, retic, iron, ferritin, urinalysis, urine toxicology, hgb electrophoresis, CMP, LDH

## 2024-09-27 NOTE — ASSESSMENT
[FreeTextEntry1] : 28 year F  with Hgb SS disease  #recent RSV vaccine and positive mediport blood cultures -will do vaccine next visit will do Adakveo next visit -will repeat cultures today, if positive will bring patient back for IV antibiotics -may need to change port or place in different area   #Elevated Alk phos and LFTS -may be related to sickling IV fluids today also tachycardia  #oligomenorrhea -interested in future pregnancies, will refer to reproductive endocrinology to evaluate ovarian reserve   #Sickle cell disease discussed disease modifying medications including hydroxyurea, l-glutamine, voxelotor, crizanlizumab Patient will ultimately need gene therapy or curative treatment well known to me, unsure of outpatient compliance Patient is  Candidate for transplant/gene therapy c/w cyclobenzaprine -treatment in 2 weeks see me in 1 month  #Pain concerned about patient dependence on IV opiates, patient is in the ED or our infusion center, explained to patient that she needs to see transplant. will d/w staff about scheduling and will try to limit and manage infusion center appointments more closely given pain contract  would do  hydromorphone, and methadone -will switch to [facility] pharmacy pain plan for ED  hydormophone 4mg, 1/2 normal saline   #HCM Last optho appointment-overdue--->needs intervention Last pneumonia vaccine Last urine protein/microalbumin Has PCP/OB/gyn-no will go to [place]       #Next appointment 2weeks with labs/port   #Labs at next appointment CBC, retic, iron, ferritin, urinalysis, urine toxicology, hgb electrophoresis, CMP, LDH
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
## 2024-09-27 NOTE — ASSESSMENT
[FreeTextEntry1] : 28 year F  with Hgb SS disease  #recent RSV vaccine and positive mediport blood cultures -will do vaccine next visit will do Adakveo next visit -will repeat cultures today, if positive will bring patient back for IV antibiotics -may need to change port or place in different area   #Elevated Alk phos and LFTS -may be related to sickling IV fluids today also tachycardia  #oligomenorrhea -interested in future pregnancies, will refer to reproductive endocrinology to evaluate ovarian reserve   #Sickle cell disease discussed disease modifying medications including hydroxyurea, l-glutamine, voxelotor, crizanlizumab Patient will ultimately need gene therapy or curative treatment well known to me, unsure of outpatient compliance Patient is  Candidate for transplant/gene therapy c/w cyclobenzaprine -treatment in 2 weeks see me in 1 month  #Pain concerned about patient dependence on IV opiates, patient is in the ED or our infusion center, explained to patient that she needs to see transplant. will d/w staff about scheduling and will try to limit and manage infusion center appointments more closely given pain contract  would do  hydromorphone, and methadone -will switch to [facility] pharmacy pain plan for ED  hydormophone 4mg, 1/2 normal saline   #HCM Last optho appointment-overdue--->needs intervention Last pneumonia vaccine Last urine protein/microalbumin Has PCP/OB/gyn-no will go to [place]       #Next appointment 2weeks with labs/port   #Labs at next appointment CBC, retic, iron, ferritin, urinalysis, urine toxicology, hgb electrophoresis, CMP, LDH

## 2024-10-07 ENCOUNTER — APPOINTMENT (OUTPATIENT)
Dept: HEMATOLOGY ONCOLOGY | Facility: CLINIC | Age: 29
End: 2024-10-07

## 2024-10-14 ENCOUNTER — RESULT REVIEW (OUTPATIENT)
Age: 29
End: 2024-10-14

## 2024-10-14 ENCOUNTER — APPOINTMENT (OUTPATIENT)
Dept: HEMATOLOGY ONCOLOGY | Facility: CLINIC | Age: 29
End: 2024-10-14

## 2024-10-14 DIAGNOSIS — N30.00 ACUTE CYSTITIS W/OUT HEMATURIA: ICD-10-CM

## 2024-10-14 RX ORDER — LEVOFLOXACIN 750 MG/1
750 TABLET, FILM COATED ORAL DAILY
Qty: 3 | Refills: 0 | Status: ACTIVE | COMMUNITY
Start: 2024-10-14 | End: 1900-01-01

## 2024-10-18 RX ORDER — FLUCONAZOLE 200 MG/1
200 TABLET ORAL DAILY
Qty: 14 | Refills: 0 | Status: ACTIVE | COMMUNITY
Start: 2024-10-14 | End: 1900-01-01

## 2024-10-30 ENCOUNTER — RESULT REVIEW (OUTPATIENT)
Age: 29
End: 2024-10-30

## 2024-10-30 ENCOUNTER — APPOINTMENT (OUTPATIENT)
Dept: HEMATOLOGY ONCOLOGY | Facility: CLINIC | Age: 29
End: 2024-10-30
Payer: MEDICARE

## 2024-10-30 VITALS
HEIGHT: 65 IN | WEIGHT: 148.13 LBS | OXYGEN SATURATION: 100 % | HEART RATE: 93 BPM | TEMPERATURE: 99.3 F | RESPIRATION RATE: 16 BRPM | SYSTOLIC BLOOD PRESSURE: 109 MMHG | BODY MASS INDEX: 24.68 KG/M2 | DIASTOLIC BLOOD PRESSURE: 72 MMHG

## 2024-10-30 DIAGNOSIS — T82.9XXA UNSPECIFIED COMPLICATION OF CARDIAC AND VASCULAR PROSTHETIC DEVICE, IMPLANT AND GRAFT, INITIAL ENCOUNTER: ICD-10-CM

## 2024-10-30 PROCEDURE — 99214 OFFICE O/P EST MOD 30 MIN: CPT

## 2024-10-31 PROBLEM — T82.9XXA VASCULAR PORT COMPLICATION: Status: ACTIVE | Noted: 2024-10-31

## 2024-11-04 DIAGNOSIS — D57.1 SICKLE-CELL DISEASE W/OUT CRISIS: ICD-10-CM

## 2024-11-09 ENCOUNTER — TRANSCRIPTION ENCOUNTER (OUTPATIENT)
Age: 29
End: 2024-11-09

## 2024-11-26 ENCOUNTER — APPOINTMENT (OUTPATIENT)
Dept: HEMATOLOGY ONCOLOGY | Facility: CLINIC | Age: 29
End: 2024-11-26

## 2024-12-02 ENCOUNTER — APPOINTMENT (OUTPATIENT)
Dept: HEMATOLOGY ONCOLOGY | Facility: CLINIC | Age: 29
End: 2024-12-02

## 2024-12-09 ENCOUNTER — NON-APPOINTMENT (OUTPATIENT)
Age: 29
End: 2024-12-09

## 2024-12-09 ENCOUNTER — RESULT REVIEW (OUTPATIENT)
Age: 29
End: 2024-12-09

## 2024-12-11 ENCOUNTER — RESULT REVIEW (OUTPATIENT)
Age: 29
End: 2024-12-11

## 2024-12-16 ENCOUNTER — TRANSCRIPTION ENCOUNTER (OUTPATIENT)
Age: 29
End: 2024-12-16

## 2024-12-19 ENCOUNTER — NON-APPOINTMENT (OUTPATIENT)
Age: 29
End: 2024-12-19

## 2024-12-19 ENCOUNTER — APPOINTMENT (OUTPATIENT)
Dept: HEMATOLOGY ONCOLOGY | Facility: CLINIC | Age: 29
End: 2024-12-19
Payer: MEDICARE

## 2024-12-19 DIAGNOSIS — T82.9XXA UNSPECIFIED COMPLICATION OF CARDIAC AND VASCULAR PROSTHETIC DEVICE, IMPLANT AND GRAFT, INITIAL ENCOUNTER: ICD-10-CM

## 2024-12-19 DIAGNOSIS — D57.1 SICKLE-CELL DISEASE W/OUT CRISIS: ICD-10-CM

## 2024-12-19 PROCEDURE — 99213 OFFICE O/P EST LOW 20 MIN: CPT

## 2024-12-19 RX ORDER — OXYCODONE HYDROCHLORIDE 30 MG/1
30 TABLET ORAL
Qty: 168 | Refills: 0 | Status: ACTIVE | COMMUNITY
Start: 2024-12-19 | End: 2025-01-16

## 2024-12-22 PROBLEM — M87.9 OSTEONECROSIS: Status: ACTIVE | Noted: 2024-12-22

## 2024-12-31 ENCOUNTER — NON-APPOINTMENT (OUTPATIENT)
Age: 29
End: 2024-12-31

## 2025-01-07 ENCOUNTER — RESULT REVIEW (OUTPATIENT)
Age: 30
End: 2025-01-07

## 2025-01-09 DIAGNOSIS — R79.89 OTHER SPECIFIED ABNORMAL FINDINGS OF BLOOD CHEMISTRY: ICD-10-CM

## 2025-01-09 DIAGNOSIS — Z92.89 PERSONAL HISTORY OF OTHER MEDICAL TREATMENT: ICD-10-CM

## 2025-01-28 ENCOUNTER — APPOINTMENT (OUTPATIENT)
Dept: HEMATOLOGY ONCOLOGY | Facility: CLINIC | Age: 30
End: 2025-01-28

## 2025-01-29 ENCOUNTER — NON-APPOINTMENT (OUTPATIENT)
Age: 30
End: 2025-01-29

## 2025-02-01 ENCOUNTER — TRANSCRIPTION ENCOUNTER (OUTPATIENT)
Age: 30
End: 2025-02-01

## 2025-02-03 ENCOUNTER — RESULT REVIEW (OUTPATIENT)
Age: 30
End: 2025-02-03

## 2025-02-10 ENCOUNTER — APPOINTMENT (OUTPATIENT)
Dept: HEMATOLOGY ONCOLOGY | Facility: CLINIC | Age: 30
End: 2025-02-10

## 2025-02-21 ENCOUNTER — RESULT REVIEW (OUTPATIENT)
Age: 30
End: 2025-02-21

## 2025-02-25 ENCOUNTER — APPOINTMENT (OUTPATIENT)
Dept: HEMATOLOGY ONCOLOGY | Facility: CLINIC | Age: 30
End: 2025-02-25

## 2025-02-27 ENCOUNTER — APPOINTMENT (OUTPATIENT)
Dept: HEMATOLOGY ONCOLOGY | Facility: CLINIC | Age: 30
End: 2025-02-27
Payer: MEDICARE

## 2025-02-27 DIAGNOSIS — D57.1 SICKLE-CELL DISEASE W/OUT CRISIS: ICD-10-CM

## 2025-02-27 DIAGNOSIS — E55.9 VITAMIN D DEFICIENCY, UNSPECIFIED: ICD-10-CM

## 2025-02-27 PROCEDURE — 99214 OFFICE O/P EST MOD 30 MIN: CPT | Mod: 2W

## 2025-02-27 RX ORDER — EVE PRIMROSE/LINOLEIC/G-LENIC 1000 MG
1000 CAPSULE ORAL DAILY
Qty: 30 | Refills: 5 | Status: ACTIVE | COMMUNITY
Start: 2025-02-27 | End: 1900-01-01

## 2025-02-27 RX ORDER — MULTIVIT-MIN/IRON/FOLIC ACID/K 18-600-40
50 MCG CAPSULE ORAL
Qty: 30 | Refills: 5 | Status: ACTIVE | COMMUNITY
Start: 2025-02-27 | End: 1900-01-01

## 2025-02-27 RX ORDER — MORPHINE SULFATE 15 MG/1
15 TABLET, FILM COATED, EXTENDED RELEASE ORAL 3 TIMES DAILY
Qty: 90 | Refills: 0 | Status: ACTIVE | COMMUNITY
Start: 2025-02-27 | End: 1900-01-01

## 2025-03-10 ENCOUNTER — RESULT REVIEW (OUTPATIENT)
Age: 30
End: 2025-03-10

## 2025-03-10 ENCOUNTER — APPOINTMENT (OUTPATIENT)
Dept: HEMATOLOGY ONCOLOGY | Facility: CLINIC | Age: 30
End: 2025-03-10
Payer: MEDICARE

## 2025-03-10 VITALS
HEART RATE: 90 BPM | WEIGHT: 152.19 LBS | RESPIRATION RATE: 17 BRPM | DIASTOLIC BLOOD PRESSURE: 85 MMHG | SYSTOLIC BLOOD PRESSURE: 135 MMHG | BODY MASS INDEX: 25.36 KG/M2 | TEMPERATURE: 98.2 F | HEIGHT: 65 IN | OXYGEN SATURATION: 100 %

## 2025-03-10 DIAGNOSIS — R79.89 OTHER SPECIFIED ABNORMAL FINDINGS OF BLOOD CHEMISTRY: ICD-10-CM

## 2025-03-10 DIAGNOSIS — E55.9 VITAMIN D DEFICIENCY, UNSPECIFIED: ICD-10-CM

## 2025-03-10 PROCEDURE — 99214 OFFICE O/P EST MOD 30 MIN: CPT

## 2025-03-11 RX ORDER — OXYCODONE HYDROCHLORIDE 15 MG/1
15 TABLET, FILM COATED, EXTENDED RELEASE ORAL
Qty: 30 | Refills: 0 | Status: ACTIVE | COMMUNITY
Start: 2025-03-11 | End: 1900-01-01

## 2025-03-13 ENCOUNTER — NON-APPOINTMENT (OUTPATIENT)
Age: 30
End: 2025-03-13

## 2025-03-17 ENCOUNTER — RESULT REVIEW (OUTPATIENT)
Age: 30
End: 2025-03-17

## 2025-03-17 ENCOUNTER — APPOINTMENT (OUTPATIENT)
Dept: HEMATOLOGY ONCOLOGY | Facility: CLINIC | Age: 30
End: 2025-03-17
Payer: MEDICARE

## 2025-03-17 VITALS
BODY MASS INDEX: 26.05 KG/M2 | HEART RATE: 85 BPM | WEIGHT: 156.38 LBS | DIASTOLIC BLOOD PRESSURE: 74 MMHG | HEIGHT: 65 IN | TEMPERATURE: 97.7 F | RESPIRATION RATE: 16 BRPM | SYSTOLIC BLOOD PRESSURE: 122 MMHG | OXYGEN SATURATION: 97 %

## 2025-03-17 DIAGNOSIS — R79.89 OTHER SPECIFIED ABNORMAL FINDINGS OF BLOOD CHEMISTRY: ICD-10-CM

## 2025-03-17 DIAGNOSIS — D57.1 SICKLE-CELL DISEASE W/OUT CRISIS: ICD-10-CM

## 2025-03-17 DIAGNOSIS — N91.5 OLIGOMENORRHEA, UNSPECIFIED: ICD-10-CM

## 2025-03-17 PROCEDURE — 99215 OFFICE O/P EST HI 40 MIN: CPT

## 2025-03-18 ENCOUNTER — NON-APPOINTMENT (OUTPATIENT)
Age: 30
End: 2025-03-18

## 2025-03-18 PROBLEM — R79.89 ELEVATED D-DIMER: Status: ACTIVE | Noted: 2025-03-18

## 2025-03-18 PROBLEM — R79.89 ELEVATED LFTS: Status: ACTIVE | Noted: 2025-03-18

## 2025-03-18 RX ORDER — ENOXAPARIN SODIUM 80 MG/.8ML
80 INJECTION, SOLUTION SUBCUTANEOUS TWICE DAILY
Qty: 5 | Refills: 5 | Status: ACTIVE | COMMUNITY
Start: 2025-03-10 | End: 1900-01-01

## 2025-03-18 RX ORDER — MORPHINE SULFATE 30 MG/1
30 TABLET, FILM COATED, EXTENDED RELEASE ORAL
Qty: 42 | Refills: 0 | Status: ACTIVE | COMMUNITY
Start: 2025-03-18 | End: 1900-01-01

## 2025-03-19 ENCOUNTER — RESULT REVIEW (OUTPATIENT)
Age: 30
End: 2025-03-19

## 2025-03-24 ENCOUNTER — RESULT REVIEW (OUTPATIENT)
Age: 30
End: 2025-03-24

## 2025-03-31 ENCOUNTER — APPOINTMENT (OUTPATIENT)
Dept: HEMATOLOGY ONCOLOGY | Facility: CLINIC | Age: 30
End: 2025-03-31

## 2025-04-08 ENCOUNTER — RESULT REVIEW (OUTPATIENT)
Age: 30
End: 2025-04-08

## 2025-04-08 ENCOUNTER — APPOINTMENT (OUTPATIENT)
Dept: HEMATOLOGY ONCOLOGY | Facility: CLINIC | Age: 30
End: 2025-04-08
Payer: MEDICARE

## 2025-04-08 VITALS
HEART RATE: 79 BPM | DIASTOLIC BLOOD PRESSURE: 81 MMHG | OXYGEN SATURATION: 99 % | RESPIRATION RATE: 16 BRPM | SYSTOLIC BLOOD PRESSURE: 128 MMHG | TEMPERATURE: 98.7 F | WEIGHT: 148.06 LBS | HEIGHT: 65 IN | BODY MASS INDEX: 24.67 KG/M2

## 2025-04-08 PROBLEM — R39.9 UTI SYMPTOMS: Status: ACTIVE | Noted: 2025-04-08

## 2025-04-08 PROCEDURE — 99214 OFFICE O/P EST MOD 30 MIN: CPT

## 2025-04-14 ENCOUNTER — RESULT REVIEW (OUTPATIENT)
Age: 30
End: 2025-04-14

## 2025-04-14 ENCOUNTER — APPOINTMENT (OUTPATIENT)
Dept: HEMATOLOGY ONCOLOGY | Facility: CLINIC | Age: 30
End: 2025-04-14
Payer: MEDICARE

## 2025-04-14 VITALS
BODY MASS INDEX: 24.66 KG/M2 | SYSTOLIC BLOOD PRESSURE: 112 MMHG | HEIGHT: 65 IN | HEART RATE: 74 BPM | TEMPERATURE: 98.7 F | DIASTOLIC BLOOD PRESSURE: 75 MMHG | RESPIRATION RATE: 16 BRPM | WEIGHT: 148 LBS | OXYGEN SATURATION: 97 %

## 2025-04-14 DIAGNOSIS — M25.50 PAIN IN UNSPECIFIED JOINT: ICD-10-CM

## 2025-04-14 DIAGNOSIS — R39.9 UNSPECIFIED SYMPTOMS AND SIGNS INVOLVING THE GENITOURINARY SYSTEM: ICD-10-CM

## 2025-04-14 PROCEDURE — 99215 OFFICE O/P EST HI 40 MIN: CPT

## 2025-04-16 ENCOUNTER — TRANSCRIPTION ENCOUNTER (OUTPATIENT)
Age: 30
End: 2025-04-16

## 2025-04-16 RX ORDER — OXYCODONE HYDROCHLORIDE 30 MG/1
30 TABLET, FILM COATED, EXTENDED RELEASE ORAL
Qty: 28 | Refills: 0 | Status: ACTIVE | COMMUNITY
Start: 2025-04-08 | End: 1900-01-01

## 2025-04-17 ENCOUNTER — NON-APPOINTMENT (OUTPATIENT)
Age: 30
End: 2025-04-17

## 2025-04-17 ENCOUNTER — RESULT REVIEW (OUTPATIENT)
Age: 30
End: 2025-04-17

## 2025-04-21 ENCOUNTER — RESULT REVIEW (OUTPATIENT)
Age: 30
End: 2025-04-21

## 2025-04-21 ENCOUNTER — APPOINTMENT (OUTPATIENT)
Dept: HEMATOLOGY ONCOLOGY | Facility: CLINIC | Age: 30
End: 2025-04-21
Payer: MEDICARE

## 2025-04-21 VITALS
SYSTOLIC BLOOD PRESSURE: 122 MMHG | WEIGHT: 152.31 LBS | HEIGHT: 65 IN | BODY MASS INDEX: 25.38 KG/M2 | RESPIRATION RATE: 16 BRPM | HEART RATE: 57 BPM | OXYGEN SATURATION: 100 % | TEMPERATURE: 96.6 F | DIASTOLIC BLOOD PRESSURE: 84 MMHG

## 2025-04-21 DIAGNOSIS — R51.9 HEADACHE, UNSPECIFIED: ICD-10-CM

## 2025-04-21 DIAGNOSIS — R79.89 OTHER SPECIFIED ABNORMAL FINDINGS OF BLOOD CHEMISTRY: ICD-10-CM

## 2025-04-21 PROCEDURE — 99214 OFFICE O/P EST MOD 30 MIN: CPT

## 2025-04-24 ENCOUNTER — NON-APPOINTMENT (OUTPATIENT)
Age: 30
End: 2025-04-24

## 2025-04-28 ENCOUNTER — RESULT REVIEW (OUTPATIENT)
Age: 30
End: 2025-04-28

## 2025-04-28 ENCOUNTER — APPOINTMENT (OUTPATIENT)
Dept: HEMATOLOGY ONCOLOGY | Facility: CLINIC | Age: 30
End: 2025-04-28
Payer: MEDICARE

## 2025-04-28 VITALS
HEIGHT: 65 IN | OXYGEN SATURATION: 100 % | RESPIRATION RATE: 16 BRPM | TEMPERATURE: 98.9 F | HEART RATE: 76 BPM | WEIGHT: 149 LBS | DIASTOLIC BLOOD PRESSURE: 80 MMHG | SYSTOLIC BLOOD PRESSURE: 120 MMHG | BODY MASS INDEX: 24.83 KG/M2

## 2025-04-28 DIAGNOSIS — D57.1 SICKLE-CELL DISEASE W/OUT CRISIS: ICD-10-CM

## 2025-04-28 DIAGNOSIS — G31.9 DEGENERATIVE DISEASE OF NERVOUS SYSTEM, UNSPECIFIED: ICD-10-CM

## 2025-04-28 PROCEDURE — 99214 OFFICE O/P EST MOD 30 MIN: CPT

## 2025-05-08 ENCOUNTER — RESULT REVIEW (OUTPATIENT)
Age: 30
End: 2025-05-08

## 2025-05-08 ENCOUNTER — NON-APPOINTMENT (OUTPATIENT)
Age: 30
End: 2025-05-08

## 2025-05-10 ENCOUNTER — NON-APPOINTMENT (OUTPATIENT)
Age: 30
End: 2025-05-10

## 2025-05-13 ENCOUNTER — RESULT REVIEW (OUTPATIENT)
Age: 30
End: 2025-05-13

## 2025-05-13 ENCOUNTER — APPOINTMENT (OUTPATIENT)
Dept: HEMATOLOGY ONCOLOGY | Facility: CLINIC | Age: 30
End: 2025-05-13
Payer: MEDICARE

## 2025-05-13 VITALS
SYSTOLIC BLOOD PRESSURE: 141 MMHG | HEART RATE: 87 BPM | TEMPERATURE: 98.3 F | BODY MASS INDEX: 25.42 KG/M2 | OXYGEN SATURATION: 96 % | WEIGHT: 152.56 LBS | RESPIRATION RATE: 16 BRPM | DIASTOLIC BLOOD PRESSURE: 77 MMHG | HEIGHT: 65 IN

## 2025-05-13 DIAGNOSIS — R79.89 OTHER SPECIFIED ABNORMAL FINDINGS OF BLOOD CHEMISTRY: ICD-10-CM

## 2025-05-13 DIAGNOSIS — D57.1 SICKLE-CELL DISEASE W/OUT CRISIS: ICD-10-CM

## 2025-05-13 PROCEDURE — 99214 OFFICE O/P EST MOD 30 MIN: CPT

## 2025-05-19 ENCOUNTER — RESULT REVIEW (OUTPATIENT)
Age: 30
End: 2025-05-19

## 2025-05-22 ENCOUNTER — TRANSCRIPTION ENCOUNTER (OUTPATIENT)
Age: 30
End: 2025-05-22

## 2025-05-23 ENCOUNTER — NON-APPOINTMENT (OUTPATIENT)
Age: 30
End: 2025-05-23

## 2025-05-27 ENCOUNTER — APPOINTMENT (OUTPATIENT)
Dept: HEMATOLOGY ONCOLOGY | Facility: CLINIC | Age: 30
End: 2025-05-27
Payer: MEDICARE

## 2025-05-27 VITALS
WEIGHT: 157.13 LBS | SYSTOLIC BLOOD PRESSURE: 123 MMHG | RESPIRATION RATE: 16 BRPM | BODY MASS INDEX: 26.18 KG/M2 | HEART RATE: 78 BPM | DIASTOLIC BLOOD PRESSURE: 78 MMHG | HEIGHT: 65 IN | OXYGEN SATURATION: 100 % | TEMPERATURE: 98.4 F

## 2025-05-27 PROCEDURE — 99214 OFFICE O/P EST MOD 30 MIN: CPT

## 2025-05-27 RX ORDER — METHADONE HYDROCHLORIDE 5 MG/1
5 TABLET ORAL TWICE DAILY
Qty: 60 | Refills: 0 | Status: ACTIVE | COMMUNITY
Start: 2025-05-27 | End: 1900-01-01

## 2025-05-27 RX ORDER — RIVAROXABAN 20 MG/1
20 TABLET, FILM COATED ORAL
Qty: 30 | Refills: 2 | Status: ACTIVE | COMMUNITY
Start: 2025-05-27 | End: 1900-01-01

## 2025-05-29 ENCOUNTER — APPOINTMENT (OUTPATIENT)
Dept: HEMATOLOGY ONCOLOGY | Facility: CLINIC | Age: 30
End: 2025-05-29
Payer: MEDICARE

## 2025-05-29 VITALS
OXYGEN SATURATION: 96 % | BODY MASS INDEX: 25.33 KG/M2 | TEMPERATURE: 99 F | SYSTOLIC BLOOD PRESSURE: 117 MMHG | HEIGHT: 65 IN | WEIGHT: 152 LBS | DIASTOLIC BLOOD PRESSURE: 78 MMHG | RESPIRATION RATE: 17 BRPM | HEART RATE: 89 BPM

## 2025-05-29 PROCEDURE — 99214 OFFICE O/P EST MOD 30 MIN: CPT

## 2025-06-02 ENCOUNTER — RESULT REVIEW (OUTPATIENT)
Age: 30
End: 2025-06-02

## 2025-06-02 ENCOUNTER — APPOINTMENT (OUTPATIENT)
Dept: HEMATOLOGY ONCOLOGY | Facility: CLINIC | Age: 30
End: 2025-06-02
Payer: MEDICARE

## 2025-06-02 ENCOUNTER — NON-APPOINTMENT (OUTPATIENT)
Age: 30
End: 2025-06-02

## 2025-06-02 VITALS
HEART RATE: 88 BPM | DIASTOLIC BLOOD PRESSURE: 80 MMHG | BODY MASS INDEX: 26.04 KG/M2 | HEIGHT: 65 IN | SYSTOLIC BLOOD PRESSURE: 119 MMHG | RESPIRATION RATE: 16 BRPM | WEIGHT: 156.31 LBS | TEMPERATURE: 97 F | OXYGEN SATURATION: 97 %

## 2025-06-02 PROCEDURE — 99214 OFFICE O/P EST MOD 30 MIN: CPT

## 2025-06-03 ENCOUNTER — APPOINTMENT (OUTPATIENT)
Dept: HEMATOLOGY ONCOLOGY | Facility: CLINIC | Age: 30
End: 2025-06-03
Payer: MEDICARE

## 2025-06-03 ENCOUNTER — NON-APPOINTMENT (OUTPATIENT)
Age: 30
End: 2025-06-03

## 2025-06-03 VITALS
HEIGHT: 65 IN | RESPIRATION RATE: 16 BRPM | HEART RATE: 77 BPM | OXYGEN SATURATION: 95 % | SYSTOLIC BLOOD PRESSURE: 132 MMHG | WEIGHT: 154.19 LBS | BODY MASS INDEX: 25.69 KG/M2 | TEMPERATURE: 99.7 F | DIASTOLIC BLOOD PRESSURE: 92 MMHG

## 2025-06-03 DIAGNOSIS — R79.89 OTHER SPECIFIED ABNORMAL FINDINGS OF BLOOD CHEMISTRY: ICD-10-CM

## 2025-06-03 DIAGNOSIS — Z92.89 PERSONAL HISTORY OF OTHER MEDICAL TREATMENT: ICD-10-CM

## 2025-06-03 PROCEDURE — 99215 OFFICE O/P EST HI 40 MIN: CPT

## 2025-06-04 ENCOUNTER — NON-APPOINTMENT (OUTPATIENT)
Age: 30
End: 2025-06-04

## 2025-06-05 ENCOUNTER — APPOINTMENT (OUTPATIENT)
Dept: HEMATOLOGY ONCOLOGY | Facility: CLINIC | Age: 30
End: 2025-06-05

## 2025-06-05 ENCOUNTER — NON-APPOINTMENT (OUTPATIENT)
Age: 30
End: 2025-06-05

## 2025-06-06 ENCOUNTER — APPOINTMENT (OUTPATIENT)
Dept: HEMATOLOGY ONCOLOGY | Facility: CLINIC | Age: 30
End: 2025-06-06
Payer: MEDICARE

## 2025-06-06 VITALS
RESPIRATION RATE: 16 BRPM | OXYGEN SATURATION: 97 % | HEIGHT: 65 IN | WEIGHT: 151 LBS | SYSTOLIC BLOOD PRESSURE: 112 MMHG | TEMPERATURE: 97.4 F | HEART RATE: 80 BPM | BODY MASS INDEX: 25.16 KG/M2 | DIASTOLIC BLOOD PRESSURE: 75 MMHG

## 2025-06-06 PROCEDURE — 99214 OFFICE O/P EST MOD 30 MIN: CPT

## 2025-06-06 RX ORDER — CHROMIUM 200 MCG
25 MCG TABLET ORAL DAILY
Qty: 30 | Refills: 3 | Status: ACTIVE | COMMUNITY
Start: 2025-06-06 | End: 1900-01-01

## 2025-06-09 ENCOUNTER — RESULT REVIEW (OUTPATIENT)
Age: 30
End: 2025-06-09

## 2025-06-09 ENCOUNTER — APPOINTMENT (OUTPATIENT)
Dept: HEMATOLOGY ONCOLOGY | Facility: CLINIC | Age: 30
End: 2025-06-09
Payer: MEDICARE

## 2025-06-09 VITALS
OXYGEN SATURATION: 99 % | RESPIRATION RATE: 16 BRPM | BODY MASS INDEX: 25.02 KG/M2 | WEIGHT: 150.19 LBS | SYSTOLIC BLOOD PRESSURE: 113 MMHG | TEMPERATURE: 98.2 F | DIASTOLIC BLOOD PRESSURE: 73 MMHG | HEART RATE: 75 BPM | HEIGHT: 65 IN

## 2025-06-09 PROBLEM — R10.84 GENERALIZED ABDOMINAL PAIN: Status: ACTIVE | Noted: 2025-06-09

## 2025-06-09 PROCEDURE — 99214 OFFICE O/P EST MOD 30 MIN: CPT

## 2025-06-10 ENCOUNTER — APPOINTMENT (OUTPATIENT)
Dept: HEMATOLOGY ONCOLOGY | Facility: CLINIC | Age: 30
End: 2025-06-10
Payer: MEDICARE

## 2025-06-10 PROBLEM — M79.601 PAIN WITH RAISING OF RIGHT UPPER EXTREMITY: Status: ACTIVE | Noted: 2025-06-10

## 2025-06-10 PROCEDURE — 99214 OFFICE O/P EST MOD 30 MIN: CPT

## 2025-06-12 ENCOUNTER — APPOINTMENT (OUTPATIENT)
Dept: HEMATOLOGY ONCOLOGY | Facility: CLINIC | Age: 30
End: 2025-06-12

## 2025-06-16 ENCOUNTER — RESULT REVIEW (OUTPATIENT)
Age: 30
End: 2025-06-16

## 2025-06-16 ENCOUNTER — APPOINTMENT (OUTPATIENT)
Dept: HEMATOLOGY ONCOLOGY | Facility: CLINIC | Age: 30
End: 2025-06-16
Payer: MEDICARE

## 2025-06-16 VITALS
DIASTOLIC BLOOD PRESSURE: 94 MMHG | HEIGHT: 65 IN | OXYGEN SATURATION: 100 % | RESPIRATION RATE: 16 BRPM | TEMPERATURE: 97 F | HEART RATE: 75 BPM | SYSTOLIC BLOOD PRESSURE: 136 MMHG | WEIGHT: 150 LBS | BODY MASS INDEX: 24.99 KG/M2

## 2025-06-16 PROCEDURE — 99214 OFFICE O/P EST MOD 30 MIN: CPT

## 2025-06-17 ENCOUNTER — APPOINTMENT (OUTPATIENT)
Dept: HEMATOLOGY ONCOLOGY | Facility: CLINIC | Age: 30
End: 2025-06-17
Payer: MEDICARE

## 2025-06-17 VITALS
RESPIRATION RATE: 16 BRPM | HEIGHT: 65 IN | SYSTOLIC BLOOD PRESSURE: 142 MMHG | TEMPERATURE: 98 F | HEART RATE: 86 BPM | OXYGEN SATURATION: 100 % | WEIGHT: 150 LBS | DIASTOLIC BLOOD PRESSURE: 95 MMHG | BODY MASS INDEX: 24.99 KG/M2

## 2025-06-17 PROBLEM — J06.9 URI, ACUTE: Status: ACTIVE | Noted: 2025-06-17

## 2025-06-17 PROCEDURE — 99214 OFFICE O/P EST MOD 30 MIN: CPT

## 2025-06-19 ENCOUNTER — RESULT REVIEW (OUTPATIENT)
Age: 30
End: 2025-06-19

## 2025-06-19 ENCOUNTER — APPOINTMENT (OUTPATIENT)
Dept: HEMATOLOGY ONCOLOGY | Facility: CLINIC | Age: 30
End: 2025-06-19
Payer: MEDICARE

## 2025-06-19 VITALS
WEIGHT: 146 LBS | HEART RATE: 83 BPM | DIASTOLIC BLOOD PRESSURE: 89 MMHG | BODY MASS INDEX: 24.32 KG/M2 | TEMPERATURE: 98.9 F | HEIGHT: 65 IN | OXYGEN SATURATION: 99 % | SYSTOLIC BLOOD PRESSURE: 138 MMHG | RESPIRATION RATE: 16 BRPM

## 2025-06-19 PROCEDURE — 99214 OFFICE O/P EST MOD 30 MIN: CPT

## 2025-06-23 ENCOUNTER — APPOINTMENT (OUTPATIENT)
Dept: HEMATOLOGY ONCOLOGY | Facility: CLINIC | Age: 30
End: 2025-06-23
Payer: MEDICARE

## 2025-06-23 ENCOUNTER — RESULT REVIEW (OUTPATIENT)
Age: 30
End: 2025-06-23

## 2025-06-23 VITALS
HEART RATE: 66 BPM | WEIGHT: 149.8 LBS | OXYGEN SATURATION: 98 % | DIASTOLIC BLOOD PRESSURE: 79 MMHG | RESPIRATION RATE: 16 BRPM | TEMPERATURE: 96.9 F | SYSTOLIC BLOOD PRESSURE: 114 MMHG | BODY MASS INDEX: 24.96 KG/M2 | HEIGHT: 65 IN

## 2025-06-23 PROCEDURE — 99214 OFFICE O/P EST MOD 30 MIN: CPT

## 2025-06-24 ENCOUNTER — APPOINTMENT (OUTPATIENT)
Dept: HEMATOLOGY ONCOLOGY | Facility: CLINIC | Age: 30
End: 2025-06-24

## 2025-06-26 ENCOUNTER — APPOINTMENT (OUTPATIENT)
Dept: HEMATOLOGY ONCOLOGY | Facility: CLINIC | Age: 30
End: 2025-06-26

## 2025-06-26 ENCOUNTER — NON-APPOINTMENT (OUTPATIENT)
Age: 30
End: 2025-06-26

## 2025-06-30 ENCOUNTER — RESULT REVIEW (OUTPATIENT)
Age: 30
End: 2025-06-30

## 2025-06-30 ENCOUNTER — APPOINTMENT (OUTPATIENT)
Dept: HEMATOLOGY ONCOLOGY | Facility: CLINIC | Age: 30
End: 2025-06-30
Payer: MEDICARE

## 2025-06-30 VITALS
SYSTOLIC BLOOD PRESSURE: 126 MMHG | HEART RATE: 101 BPM | WEIGHT: 149 LBS | BODY MASS INDEX: 24.83 KG/M2 | DIASTOLIC BLOOD PRESSURE: 82 MMHG | OXYGEN SATURATION: 97 % | TEMPERATURE: 97.6 F | RESPIRATION RATE: 16 BRPM | HEIGHT: 65 IN

## 2025-06-30 PROBLEM — N92.6 IRREGULAR MENSES: Status: ACTIVE | Noted: 2025-06-30

## 2025-06-30 PROCEDURE — 99214 OFFICE O/P EST MOD 30 MIN: CPT

## 2025-07-03 ENCOUNTER — TRANSCRIPTION ENCOUNTER (OUTPATIENT)
Age: 30
End: 2025-07-03

## 2025-07-08 ENCOUNTER — RESULT REVIEW (OUTPATIENT)
Age: 30
End: 2025-07-08

## 2025-07-08 ENCOUNTER — APPOINTMENT (OUTPATIENT)
Dept: HEMATOLOGY ONCOLOGY | Facility: CLINIC | Age: 30
End: 2025-07-08
Payer: MEDICARE

## 2025-07-08 VITALS
HEART RATE: 88 BPM | HEIGHT: 65 IN | OXYGEN SATURATION: 99 % | BODY MASS INDEX: 24.72 KG/M2 | DIASTOLIC BLOOD PRESSURE: 87 MMHG | TEMPERATURE: 98.9 F | WEIGHT: 148.38 LBS | RESPIRATION RATE: 16 BRPM | SYSTOLIC BLOOD PRESSURE: 122 MMHG

## 2025-07-08 PROBLEM — M87.00 AVASCULAR NECROSIS: Status: ACTIVE | Noted: 2025-07-08

## 2025-07-08 PROBLEM — M25.511 RIGHT SHOULDER PAIN: Status: ACTIVE | Noted: 2025-07-08

## 2025-07-08 PROCEDURE — 99214 OFFICE O/P EST MOD 30 MIN: CPT

## 2025-07-10 ENCOUNTER — APPOINTMENT (OUTPATIENT)
Dept: HEMATOLOGY ONCOLOGY | Facility: CLINIC | Age: 30
End: 2025-07-10

## 2025-07-10 VITALS
OXYGEN SATURATION: 100 % | DIASTOLIC BLOOD PRESSURE: 85 MMHG | HEART RATE: 92 BPM | TEMPERATURE: 98.5 F | RESPIRATION RATE: 16 BRPM | SYSTOLIC BLOOD PRESSURE: 125 MMHG | HEIGHT: 65 IN | BODY MASS INDEX: 25.14 KG/M2 | WEIGHT: 150.9 LBS

## 2025-07-14 ENCOUNTER — RESULT REVIEW (OUTPATIENT)
Age: 30
End: 2025-07-14

## 2025-07-14 ENCOUNTER — APPOINTMENT (OUTPATIENT)
Dept: HEMATOLOGY ONCOLOGY | Facility: CLINIC | Age: 30
End: 2025-07-14
Payer: MEDICARE

## 2025-07-14 VITALS
TEMPERATURE: 97 F | DIASTOLIC BLOOD PRESSURE: 80 MMHG | WEIGHT: 151.25 LBS | BODY MASS INDEX: 25.2 KG/M2 | OXYGEN SATURATION: 100 % | SYSTOLIC BLOOD PRESSURE: 124 MMHG | RESPIRATION RATE: 16 BRPM | HEIGHT: 65 IN | HEART RATE: 78 BPM

## 2025-07-14 PROCEDURE — 99214 OFFICE O/P EST MOD 30 MIN: CPT

## 2025-07-17 ENCOUNTER — APPOINTMENT (OUTPATIENT)
Dept: HEMATOLOGY ONCOLOGY | Facility: CLINIC | Age: 30
End: 2025-07-17

## 2025-07-21 ENCOUNTER — RESULT REVIEW (OUTPATIENT)
Age: 30
End: 2025-07-21

## 2025-07-21 ENCOUNTER — APPOINTMENT (OUTPATIENT)
Dept: HEMATOLOGY ONCOLOGY | Facility: CLINIC | Age: 30
End: 2025-07-21
Payer: MEDICARE

## 2025-07-21 VITALS
HEIGHT: 65 IN | WEIGHT: 151.56 LBS | SYSTOLIC BLOOD PRESSURE: 136 MMHG | OXYGEN SATURATION: 100 % | DIASTOLIC BLOOD PRESSURE: 96 MMHG | RESPIRATION RATE: 16 BRPM | BODY MASS INDEX: 25.25 KG/M2 | HEART RATE: 88 BPM | TEMPERATURE: 97 F

## 2025-07-21 PROCEDURE — 99214 OFFICE O/P EST MOD 30 MIN: CPT

## 2025-07-22 ENCOUNTER — APPOINTMENT (OUTPATIENT)
Dept: HEMATOLOGY ONCOLOGY | Facility: CLINIC | Age: 30
End: 2025-07-22

## 2025-07-24 ENCOUNTER — APPOINTMENT (OUTPATIENT)
Dept: HEMATOLOGY ONCOLOGY | Facility: CLINIC | Age: 30
End: 2025-07-24
Payer: MEDICARE

## 2025-07-24 VITALS
DIASTOLIC BLOOD PRESSURE: 85 MMHG | RESPIRATION RATE: 16 BRPM | HEART RATE: 76 BPM | TEMPERATURE: 99.2 F | OXYGEN SATURATION: 100 % | SYSTOLIC BLOOD PRESSURE: 125 MMHG | BODY MASS INDEX: 24.99 KG/M2 | WEIGHT: 150 LBS | HEIGHT: 65 IN

## 2025-07-24 DIAGNOSIS — R19.00 INTRA-ABDOMINAL AND PELVIC SWELLING, MASS AND LUMP, UNSPECIFIED SITE: ICD-10-CM

## 2025-07-24 DIAGNOSIS — K76.9 LIVER DISEASE, UNSPECIFIED: ICD-10-CM

## 2025-07-24 PROCEDURE — 99214 OFFICE O/P EST MOD 30 MIN: CPT

## 2025-07-24 RX ORDER — DOCUSATE SODIUM 100 MG/1
100 CAPSULE ORAL TWICE DAILY
Qty: 60 | Refills: 5 | Status: ACTIVE | COMMUNITY
Start: 2025-07-24 | End: 1900-01-01

## 2025-07-24 RX ORDER — SENNOSIDES 8.6 MG/1
8.6 TABLET ORAL
Qty: 30 | Refills: 5 | Status: ACTIVE | COMMUNITY
Start: 2025-07-24 | End: 1900-01-01

## 2025-07-28 ENCOUNTER — RESULT REVIEW (OUTPATIENT)
Age: 30
End: 2025-07-28

## 2025-07-28 ENCOUNTER — APPOINTMENT (OUTPATIENT)
Dept: HEMATOLOGY ONCOLOGY | Facility: CLINIC | Age: 30
End: 2025-07-28
Payer: MEDICARE

## 2025-07-28 VITALS
SYSTOLIC BLOOD PRESSURE: 128 MMHG | RESPIRATION RATE: 16 BRPM | HEIGHT: 65 IN | DIASTOLIC BLOOD PRESSURE: 87 MMHG | TEMPERATURE: 97.7 F | WEIGHT: 147.13 LBS | BODY MASS INDEX: 24.51 KG/M2 | OXYGEN SATURATION: 100 % | HEART RATE: 84 BPM

## 2025-07-28 PROCEDURE — 99214 OFFICE O/P EST MOD 30 MIN: CPT

## 2025-07-29 ENCOUNTER — APPOINTMENT (OUTPATIENT)
Dept: HEMATOLOGY ONCOLOGY | Facility: CLINIC | Age: 30
End: 2025-07-29

## 2025-07-31 ENCOUNTER — APPOINTMENT (OUTPATIENT)
Dept: HEMATOLOGY ONCOLOGY | Facility: CLINIC | Age: 30
End: 2025-07-31

## 2025-08-04 ENCOUNTER — NON-APPOINTMENT (OUTPATIENT)
Age: 30
End: 2025-08-04

## 2025-08-04 ENCOUNTER — RESULT REVIEW (OUTPATIENT)
Age: 30
End: 2025-08-04

## 2025-08-04 ENCOUNTER — APPOINTMENT (OUTPATIENT)
Dept: HEMATOLOGY ONCOLOGY | Facility: CLINIC | Age: 30
End: 2025-08-04
Payer: MEDICARE

## 2025-08-04 VITALS
HEIGHT: 65 IN | WEIGHT: 146.56 LBS | OXYGEN SATURATION: 94 % | HEART RATE: 80 BPM | RESPIRATION RATE: 16 BRPM | TEMPERATURE: 97.9 F | DIASTOLIC BLOOD PRESSURE: 88 MMHG | SYSTOLIC BLOOD PRESSURE: 132 MMHG | BODY MASS INDEX: 24.42 KG/M2

## 2025-08-04 PROCEDURE — 99214 OFFICE O/P EST MOD 30 MIN: CPT

## 2025-08-04 RX ORDER — METHADONE HYDROCHLORIDE 10 MG/1
10 TABLET ORAL
Qty: 60 | Refills: 0 | Status: ACTIVE | COMMUNITY
Start: 2025-08-04 | End: 1900-01-01

## 2025-08-07 ENCOUNTER — APPOINTMENT (OUTPATIENT)
Dept: HEMATOLOGY ONCOLOGY | Facility: CLINIC | Age: 30
End: 2025-08-07
Payer: MEDICARE

## 2025-08-07 VITALS
SYSTOLIC BLOOD PRESSURE: 120 MMHG | TEMPERATURE: 97.8 F | HEART RATE: 83 BPM | OXYGEN SATURATION: 100 % | RESPIRATION RATE: 16 BRPM | BODY MASS INDEX: 24.66 KG/M2 | HEIGHT: 65 IN | DIASTOLIC BLOOD PRESSURE: 77 MMHG | WEIGHT: 148 LBS

## 2025-08-07 PROCEDURE — 99215 OFFICE O/P EST HI 40 MIN: CPT

## 2025-08-08 ENCOUNTER — RESULT REVIEW (OUTPATIENT)
Age: 30
End: 2025-08-08

## 2025-08-08 ENCOUNTER — APPOINTMENT (OUTPATIENT)
Dept: HEMATOLOGY ONCOLOGY | Facility: CLINIC | Age: 30
End: 2025-08-08
Payer: MEDICARE

## 2025-08-08 VITALS
BODY MASS INDEX: 24.49 KG/M2 | DIASTOLIC BLOOD PRESSURE: 89 MMHG | RESPIRATION RATE: 16 BRPM | SYSTOLIC BLOOD PRESSURE: 130 MMHG | TEMPERATURE: 98.6 F | OXYGEN SATURATION: 99 % | WEIGHT: 147 LBS | HEART RATE: 75 BPM | HEIGHT: 65 IN

## 2025-08-08 PROCEDURE — 99214 OFFICE O/P EST MOD 30 MIN: CPT

## 2025-08-08 RX ORDER — DEFERASIROX 360 MG/1
360 TABLET, FILM COATED ORAL
Qty: 30 | Refills: 5 | Status: ACTIVE | COMMUNITY
Start: 2025-07-24 | End: 1900-01-01

## 2025-08-11 ENCOUNTER — RESULT REVIEW (OUTPATIENT)
Age: 30
End: 2025-08-11

## 2025-08-11 ENCOUNTER — APPOINTMENT (OUTPATIENT)
Dept: HEMATOLOGY ONCOLOGY | Facility: CLINIC | Age: 30
End: 2025-08-11
Payer: MEDICARE

## 2025-08-11 ENCOUNTER — NON-APPOINTMENT (OUTPATIENT)
Age: 30
End: 2025-08-11

## 2025-08-11 VITALS
WEIGHT: 147.56 LBS | DIASTOLIC BLOOD PRESSURE: 78 MMHG | HEART RATE: 96 BPM | HEIGHT: 65 IN | BODY MASS INDEX: 24.58 KG/M2 | TEMPERATURE: 98.4 F | OXYGEN SATURATION: 100 % | RESPIRATION RATE: 16 BRPM | SYSTOLIC BLOOD PRESSURE: 123 MMHG

## 2025-08-11 PROCEDURE — 99214 OFFICE O/P EST MOD 30 MIN: CPT

## 2025-08-12 ENCOUNTER — NON-APPOINTMENT (OUTPATIENT)
Age: 30
End: 2025-08-12

## 2025-08-12 ENCOUNTER — APPOINTMENT (OUTPATIENT)
Dept: HEMATOLOGY ONCOLOGY | Facility: CLINIC | Age: 30
End: 2025-08-12
Payer: MEDICARE

## 2025-08-12 VITALS
DIASTOLIC BLOOD PRESSURE: 61 MMHG | RESPIRATION RATE: 16 BRPM | WEIGHT: 153 LBS | HEART RATE: 93 BPM | SYSTOLIC BLOOD PRESSURE: 105 MMHG | BODY MASS INDEX: 25.49 KG/M2 | OXYGEN SATURATION: 99 % | HEIGHT: 65 IN | TEMPERATURE: 96.9 F

## 2025-08-12 PROCEDURE — 99214 OFFICE O/P EST MOD 30 MIN: CPT

## 2025-08-12 RX ORDER — ENOXAPARIN SODIUM 100 MG/ML
100 INJECTION, SOLUTION SUBCUTANEOUS
Qty: 30 | Refills: 1 | Status: ACTIVE | COMMUNITY
Start: 2025-08-07 | End: 1900-01-01

## 2025-08-14 ENCOUNTER — RESULT REVIEW (OUTPATIENT)
Age: 30
End: 2025-08-14

## 2025-08-14 ENCOUNTER — APPOINTMENT (OUTPATIENT)
Dept: HEMATOLOGY ONCOLOGY | Facility: CLINIC | Age: 30
End: 2025-08-14
Payer: MEDICARE

## 2025-08-14 VITALS
HEART RATE: 90 BPM | HEIGHT: 65 IN | SYSTOLIC BLOOD PRESSURE: 123 MMHG | RESPIRATION RATE: 16 BRPM | DIASTOLIC BLOOD PRESSURE: 87 MMHG | WEIGHT: 145.56 LBS | OXYGEN SATURATION: 100 % | TEMPERATURE: 98.5 F | BODY MASS INDEX: 24.25 KG/M2

## 2025-08-14 DIAGNOSIS — I26.99 OTHER PULMONARY EMBOLISM W/OUT ACUTE COR PULMONALE: ICD-10-CM

## 2025-08-14 PROCEDURE — 99214 OFFICE O/P EST MOD 30 MIN: CPT

## 2025-08-15 ENCOUNTER — RESULT REVIEW (OUTPATIENT)
Age: 30
End: 2025-08-15

## 2025-08-15 ENCOUNTER — APPOINTMENT (OUTPATIENT)
Dept: HEMATOLOGY ONCOLOGY | Facility: CLINIC | Age: 30
End: 2025-08-15
Payer: MEDICARE

## 2025-08-15 VITALS
OXYGEN SATURATION: 98 % | DIASTOLIC BLOOD PRESSURE: 90 MMHG | BODY MASS INDEX: 24.66 KG/M2 | HEIGHT: 65 IN | SYSTOLIC BLOOD PRESSURE: 135 MMHG | TEMPERATURE: 98.3 F | WEIGHT: 148 LBS | RESPIRATION RATE: 18 BRPM | HEART RATE: 76 BPM

## 2025-08-15 DIAGNOSIS — R07.89 OTHER CHEST PAIN: ICD-10-CM

## 2025-08-15 PROCEDURE — 99214 OFFICE O/P EST MOD 30 MIN: CPT

## 2025-08-18 ENCOUNTER — RESULT REVIEW (OUTPATIENT)
Age: 30
End: 2025-08-18

## 2025-08-18 ENCOUNTER — APPOINTMENT (OUTPATIENT)
Dept: HEMATOLOGY ONCOLOGY | Facility: CLINIC | Age: 30
End: 2025-08-18
Payer: MEDICARE

## 2025-08-18 VITALS
DIASTOLIC BLOOD PRESSURE: 72 MMHG | HEIGHT: 65 IN | WEIGHT: 151.56 LBS | BODY MASS INDEX: 25.25 KG/M2 | OXYGEN SATURATION: 96 % | SYSTOLIC BLOOD PRESSURE: 112 MMHG | RESPIRATION RATE: 16 BRPM | HEART RATE: 80 BPM | TEMPERATURE: 97 F

## 2025-08-18 PROCEDURE — 99214 OFFICE O/P EST MOD 30 MIN: CPT

## 2025-08-21 ENCOUNTER — APPOINTMENT (OUTPATIENT)
Dept: HEMATOLOGY ONCOLOGY | Facility: CLINIC | Age: 30
End: 2025-08-21
Payer: MEDICARE

## 2025-08-21 ENCOUNTER — RESULT REVIEW (OUTPATIENT)
Age: 30
End: 2025-08-21

## 2025-08-21 PROCEDURE — 99214 OFFICE O/P EST MOD 30 MIN: CPT

## 2025-08-25 ENCOUNTER — APPOINTMENT (OUTPATIENT)
Dept: HEMATOLOGY ONCOLOGY | Facility: CLINIC | Age: 30
End: 2025-08-25
Payer: MEDICARE

## 2025-08-25 ENCOUNTER — RESULT REVIEW (OUTPATIENT)
Age: 30
End: 2025-08-25

## 2025-08-25 ENCOUNTER — NON-APPOINTMENT (OUTPATIENT)
Age: 30
End: 2025-08-25

## 2025-08-25 VITALS
TEMPERATURE: 97 F | HEART RATE: 79 BPM | RESPIRATION RATE: 16 BRPM | WEIGHT: 151.19 LBS | SYSTOLIC BLOOD PRESSURE: 130 MMHG | BODY MASS INDEX: 25.19 KG/M2 | OXYGEN SATURATION: 96 % | HEIGHT: 65 IN | DIASTOLIC BLOOD PRESSURE: 84 MMHG

## 2025-08-25 DIAGNOSIS — D57.1 SICKLE-CELL DISEASE W/OUT CRISIS: ICD-10-CM

## 2025-08-25 DIAGNOSIS — M87.9 OSTEONECROSIS, UNSPECIFIED: ICD-10-CM

## 2025-08-25 PROCEDURE — 99214 OFFICE O/P EST MOD 30 MIN: CPT

## 2025-08-25 RX ORDER — CYANOCOBALAMIN (VITAMIN B-12) 500 MCG
400 LOZENGE ORAL
Qty: 60 | Refills: 0 | Status: ACTIVE | COMMUNITY
Start: 2025-08-25 | End: 1900-01-01

## 2025-08-25 RX ORDER — PENTOXIFYLLINE 400 MG/1
400 TABLET, EXTENDED RELEASE ORAL 3 TIMES DAILY
Qty: 90 | Refills: 0 | Status: ACTIVE | COMMUNITY
Start: 2025-08-25 | End: 1900-01-01

## 2025-08-26 ENCOUNTER — NON-APPOINTMENT (OUTPATIENT)
Age: 30
End: 2025-08-26

## 2025-08-28 ENCOUNTER — RESULT REVIEW (OUTPATIENT)
Age: 30
End: 2025-08-28

## 2025-08-28 ENCOUNTER — APPOINTMENT (OUTPATIENT)
Dept: HEMATOLOGY ONCOLOGY | Facility: CLINIC | Age: 30
End: 2025-08-28
Payer: MEDICARE

## 2025-08-28 VITALS
OXYGEN SATURATION: 100 % | HEIGHT: 65 IN | BODY MASS INDEX: 24.99 KG/M2 | RESPIRATION RATE: 17 BRPM | TEMPERATURE: 98.9 F | HEART RATE: 84 BPM | WEIGHT: 150 LBS | SYSTOLIC BLOOD PRESSURE: 117 MMHG | DIASTOLIC BLOOD PRESSURE: 77 MMHG

## 2025-08-28 DIAGNOSIS — E59: ICD-10-CM

## 2025-08-28 PROCEDURE — 99214 OFFICE O/P EST MOD 30 MIN: CPT

## 2025-08-29 ENCOUNTER — NON-APPOINTMENT (OUTPATIENT)
Age: 30
End: 2025-08-29

## 2025-09-02 ENCOUNTER — NON-APPOINTMENT (OUTPATIENT)
Age: 30
End: 2025-09-02

## 2025-09-04 ENCOUNTER — RESULT REVIEW (OUTPATIENT)
Age: 30
End: 2025-09-04

## 2025-09-04 ENCOUNTER — APPOINTMENT (OUTPATIENT)
Dept: HEMATOLOGY ONCOLOGY | Facility: CLINIC | Age: 30
End: 2025-09-04
Payer: MEDICARE

## 2025-09-04 VITALS
DIASTOLIC BLOOD PRESSURE: 76 MMHG | HEIGHT: 65 IN | TEMPERATURE: 98.5 F | RESPIRATION RATE: 17 BRPM | BODY MASS INDEX: 24.83 KG/M2 | SYSTOLIC BLOOD PRESSURE: 115 MMHG | WEIGHT: 149 LBS | HEART RATE: 80 BPM | OXYGEN SATURATION: 97 %

## 2025-09-04 DIAGNOSIS — E55.9 VITAMIN D DEFICIENCY, UNSPECIFIED: ICD-10-CM

## 2025-09-04 PROCEDURE — 99214 OFFICE O/P EST MOD 30 MIN: CPT

## 2025-09-05 ENCOUNTER — APPOINTMENT (OUTPATIENT)
Dept: HEMATOLOGY ONCOLOGY | Facility: CLINIC | Age: 30
End: 2025-09-05
Payer: MEDICARE

## 2025-09-05 VITALS
HEART RATE: 80 BPM | HEIGHT: 65 IN | DIASTOLIC BLOOD PRESSURE: 83 MMHG | WEIGHT: 158 LBS | BODY MASS INDEX: 26.33 KG/M2 | RESPIRATION RATE: 16 BRPM | SYSTOLIC BLOOD PRESSURE: 124 MMHG | TEMPERATURE: 98.1 F | OXYGEN SATURATION: 100 %

## 2025-09-05 DIAGNOSIS — D57.1 SICKLE-CELL DISEASE W/OUT CRISIS: ICD-10-CM

## 2025-09-05 DIAGNOSIS — M87.9 OSTEONECROSIS, UNSPECIFIED: ICD-10-CM

## 2025-09-05 PROCEDURE — 99214 OFFICE O/P EST MOD 30 MIN: CPT

## 2025-09-09 ENCOUNTER — APPOINTMENT (OUTPATIENT)
Dept: HEMATOLOGY ONCOLOGY | Facility: CLINIC | Age: 30
End: 2025-09-09

## 2025-09-11 ENCOUNTER — APPOINTMENT (OUTPATIENT)
Dept: HEMATOLOGY ONCOLOGY | Facility: CLINIC | Age: 30
End: 2025-09-11
Payer: MEDICARE

## 2025-09-11 ENCOUNTER — RESULT REVIEW (OUTPATIENT)
Age: 30
End: 2025-09-11

## 2025-09-11 VITALS
HEIGHT: 65 IN | OXYGEN SATURATION: 98 % | WEIGHT: 150 LBS | SYSTOLIC BLOOD PRESSURE: 129 MMHG | TEMPERATURE: 99.3 F | BODY MASS INDEX: 24.99 KG/M2 | RESPIRATION RATE: 17 BRPM | DIASTOLIC BLOOD PRESSURE: 85 MMHG | HEART RATE: 71 BPM

## 2025-09-11 DIAGNOSIS — D57.1 SICKLE-CELL DISEASE W/OUT CRISIS: ICD-10-CM

## 2025-09-11 PROCEDURE — 99214 OFFICE O/P EST MOD 30 MIN: CPT

## 2025-09-12 ENCOUNTER — APPOINTMENT (OUTPATIENT)
Dept: RADIOLOGY | Facility: CLINIC | Age: 30
End: 2025-09-12
Payer: MEDICARE

## 2025-09-12 PROCEDURE — 71046 X-RAY EXAM CHEST 2 VIEWS: CPT

## 2025-09-15 ENCOUNTER — RESULT REVIEW (OUTPATIENT)
Age: 30
End: 2025-09-15

## 2025-09-15 ENCOUNTER — APPOINTMENT (OUTPATIENT)
Dept: HEMATOLOGY ONCOLOGY | Facility: CLINIC | Age: 30
End: 2025-09-15

## 2025-09-15 VITALS
SYSTOLIC BLOOD PRESSURE: 123 MMHG | HEART RATE: 86 BPM | WEIGHT: 152.06 LBS | HEIGHT: 65 IN | BODY MASS INDEX: 25.33 KG/M2 | TEMPERATURE: 97.2 F | RESPIRATION RATE: 16 BRPM | DIASTOLIC BLOOD PRESSURE: 86 MMHG | OXYGEN SATURATION: 97 %

## 2025-09-15 DIAGNOSIS — K04.7 PERIAPICAL ABSCESS W/OUT SINUS: ICD-10-CM

## 2025-09-15 RX ORDER — METRONIDAZOLE 500 MG/1
500 TABLET ORAL 3 TIMES DAILY
Qty: 15 | Refills: 0 | Status: ACTIVE | COMMUNITY
Start: 2025-09-15 | End: 1900-01-01

## 2025-09-15 RX ORDER — LEVOFLOXACIN 750 MG/1
750 TABLET, FILM COATED ORAL DAILY
Qty: 5 | Refills: 0 | Status: ACTIVE | COMMUNITY
Start: 2025-09-15 | End: 1900-01-01

## 2025-09-18 ENCOUNTER — APPOINTMENT (OUTPATIENT)
Dept: HEMATOLOGY ONCOLOGY | Facility: CLINIC | Age: 30
End: 2025-09-18

## 2025-09-18 DIAGNOSIS — D57.1 SICKLE-CELL DISEASE W/OUT CRISIS: ICD-10-CM

## 2025-09-19 ENCOUNTER — APPOINTMENT (OUTPATIENT)
Dept: HEMATOLOGY ONCOLOGY | Facility: CLINIC | Age: 30
End: 2025-09-19